# Patient Record
Sex: FEMALE | Race: WHITE | NOT HISPANIC OR LATINO | URBAN - METROPOLITAN AREA
[De-identification: names, ages, dates, MRNs, and addresses within clinical notes are randomized per-mention and may not be internally consistent; named-entity substitution may affect disease eponyms.]

---

## 2017-01-13 ENCOUNTER — TRANSFERRED RECORDS (OUTPATIENT)
Dept: HEALTH INFORMATION MANAGEMENT | Facility: CLINIC | Age: 62
End: 2017-01-13

## 2017-01-17 ENCOUNTER — MEDICAL CORRESPONDENCE (OUTPATIENT)
Dept: HEALTH INFORMATION MANAGEMENT | Facility: CLINIC | Age: 62
End: 2017-01-17

## 2017-01-30 ENCOUNTER — OFFICE VISIT (OUTPATIENT)
Dept: SURGERY | Facility: CLINIC | Age: 62
End: 2017-01-30

## 2017-01-30 ENCOUNTER — ANESTHESIA EVENT (OUTPATIENT)
Dept: SURGERY | Facility: CLINIC | Age: 62
DRG: 737 | End: 2017-01-30
Payer: COMMERCIAL

## 2017-01-30 ENCOUNTER — ONCOLOGY VISIT (OUTPATIENT)
Dept: ONCOLOGY | Facility: CLINIC | Age: 62
End: 2017-01-30
Attending: OBSTETRICS & GYNECOLOGY
Payer: COMMERCIAL

## 2017-01-30 ENCOUNTER — ALLIED HEALTH/NURSE VISIT (OUTPATIENT)
Dept: SURGERY | Facility: CLINIC | Age: 62
End: 2017-01-30

## 2017-01-30 VITALS
HEIGHT: 66 IN | RESPIRATION RATE: 16 BRPM | OXYGEN SATURATION: 95 % | HEART RATE: 93 BPM | WEIGHT: 132.6 LBS | DIASTOLIC BLOOD PRESSURE: 82 MMHG | TEMPERATURE: 98.1 F | SYSTOLIC BLOOD PRESSURE: 138 MMHG | BODY MASS INDEX: 21.31 KG/M2

## 2017-01-30 VITALS
HEART RATE: 90 BPM | OXYGEN SATURATION: 100 % | TEMPERATURE: 98.3 F | BODY MASS INDEX: 21.31 KG/M2 | DIASTOLIC BLOOD PRESSURE: 77 MMHG | WEIGHT: 132.6 LBS | SYSTOLIC BLOOD PRESSURE: 130 MMHG | HEIGHT: 66 IN | RESPIRATION RATE: 18 BRPM

## 2017-01-30 DIAGNOSIS — Z01.818 PREOPERATIVE EXAMINATION: ICD-10-CM

## 2017-01-30 DIAGNOSIS — Z01.818 PREOP EXAMINATION: Primary | ICD-10-CM

## 2017-01-30 DIAGNOSIS — C56.9 MALIGNANT NEOPLASM OF OVARY (H): ICD-10-CM

## 2017-01-30 DIAGNOSIS — C56.9 MALIGNANT NEOPLASM OF OVARY (H): Primary | ICD-10-CM

## 2017-01-30 DIAGNOSIS — R19.03 ABDOMINAL MASS, RIGHT LOWER QUADRANT: ICD-10-CM

## 2017-01-30 PROBLEM — D25.9 UTERINE FIBROID: Status: ACTIVE | Noted: 2017-01-30

## 2017-01-30 LAB
ALBUMIN SERPL-MCNC: 3.2 G/DL (ref 3.4–5)
ALP SERPL-CCNC: 350 U/L (ref 40–150)
ALT SERPL W P-5'-P-CCNC: 17 U/L (ref 0–50)
ANION GAP SERPL CALCULATED.3IONS-SCNC: 8 MMOL/L (ref 3–14)
AST SERPL W P-5'-P-CCNC: 16 U/L (ref 0–45)
BASOPHILS # BLD AUTO: 0.1 10E9/L (ref 0–0.2)
BASOPHILS NFR BLD AUTO: 0.6 %
BILIRUB SERPL-MCNC: 0.2 MG/DL (ref 0.2–1.3)
BUN SERPL-MCNC: 7 MG/DL (ref 7–30)
CALCIUM SERPL-MCNC: 9 MG/DL (ref 8.5–10.1)
CANCER AG125 SERPL-ACNC: 5232 U/ML (ref 0–30)
CEA SERPL-MCNC: 8.2 UG/L (ref 0–2.5)
CHLORIDE SERPL-SCNC: 100 MMOL/L (ref 94–109)
CO2 SERPL-SCNC: 29 MMOL/L (ref 20–32)
CREAT SERPL-MCNC: 0.58 MG/DL (ref 0.52–1.04)
DIFFERENTIAL METHOD BLD: ABNORMAL
EOSINOPHIL # BLD AUTO: 0.1 10E9/L (ref 0–0.7)
EOSINOPHIL NFR BLD AUTO: 0.6 %
ERYTHROCYTE [DISTWIDTH] IN BLOOD BY AUTOMATED COUNT: 14 % (ref 10–15)
GFR SERPL CREATININE-BSD FRML MDRD: ABNORMAL ML/MIN/1.7M2
GLUCOSE SERPL-MCNC: 94 MG/DL (ref 70–99)
HCT VFR BLD AUTO: 39.3 % (ref 35–47)
HGB BLD-MCNC: 12.7 G/DL (ref 11.7–15.7)
IMM GRANULOCYTES # BLD: 0.1 10E9/L (ref 0–0.4)
IMM GRANULOCYTES NFR BLD: 0.5 %
LYMPHOCYTES # BLD AUTO: 2.1 10E9/L (ref 0.8–5.3)
LYMPHOCYTES NFR BLD AUTO: 13.4 %
MCH RBC QN AUTO: 29.5 PG (ref 26.5–33)
MCHC RBC AUTO-ENTMCNC: 32.3 G/DL (ref 31.5–36.5)
MCV RBC AUTO: 91 FL (ref 78–100)
MONOCYTES # BLD AUTO: 1 10E9/L (ref 0–1.3)
MONOCYTES NFR BLD AUTO: 6.4 %
NEUTROPHILS # BLD AUTO: 12 10E9/L (ref 1.6–8.3)
NEUTROPHILS NFR BLD AUTO: 78.5 %
NRBC # BLD AUTO: 0 10*3/UL
NRBC BLD AUTO-RTO: 0 /100
PLATELET # BLD AUTO: 654 10E9/L (ref 150–450)
POTASSIUM SERPL-SCNC: 4 MMOL/L (ref 3.4–5.3)
PROT SERPL-MCNC: 7.4 G/DL (ref 6.8–8.8)
RBC # BLD AUTO: 4.3 10E12/L (ref 3.8–5.2)
SODIUM SERPL-SCNC: 138 MMOL/L (ref 133–144)
WBC # BLD AUTO: 15.3 10E9/L (ref 4–11)

## 2017-01-30 PROCEDURE — 99205 OFFICE O/P NEW HI 60 MIN: CPT | Mod: 25 | Performed by: OBSTETRICS & GYNECOLOGY

## 2017-01-30 PROCEDURE — 85025 COMPLETE CBC W/AUTO DIFF WBC: CPT | Performed by: OBSTETRICS & GYNECOLOGY

## 2017-01-30 PROCEDURE — 99213 OFFICE O/P EST LOW 20 MIN: CPT

## 2017-01-30 PROCEDURE — 36415 COLL VENOUS BLD VENIPUNCTURE: CPT | Performed by: OBSTETRICS & GYNECOLOGY

## 2017-01-30 PROCEDURE — 80053 COMPREHEN METABOLIC PANEL: CPT | Performed by: OBSTETRICS & GYNECOLOGY

## 2017-01-30 PROCEDURE — 86304 IMMUNOASSAY TUMOR CA 125: CPT | Performed by: OBSTETRICS & GYNECOLOGY

## 2017-01-30 PROCEDURE — 82378 CARCINOEMBRYONIC ANTIGEN: CPT | Performed by: OBSTETRICS & GYNECOLOGY

## 2017-01-30 RX ORDER — ALBUTEROL SULFATE 90 UG/1
2 AEROSOL, METERED RESPIRATORY (INHALATION) DAILY PRN
COMMUNITY
Start: 2013-06-26

## 2017-01-30 RX ORDER — SODIUM PHOSPHATE,MONO-DIBASIC 19G-7G/118
1 ENEMA (ML) RECTAL DAILY
COMMUNITY

## 2017-01-30 RX ORDER — IPRATROPIUM BROMIDE AND ALBUTEROL SULFATE 2.5; .5 MG/3ML; MG/3ML
3 SOLUTION RESPIRATORY (INHALATION) ONCE
Status: CANCELLED | OUTPATIENT
Start: 2017-01-30 | End: 2017-01-30

## 2017-01-30 ASSESSMENT — ENCOUNTER SYMPTOMS
POLYPHAGIA: 0
HEARTBURN: 1
BLOOD IN STOOL: 0
NIGHT SWEATS: 0
INCREASED ENERGY: 1
JAUNDICE: 0
CHILLS: 1
ALTERED TEMPERATURE REGULATION: 0
CONSTIPATION: 1
VOMITING: 1
WEIGHT GAIN: 0
RECTAL PAIN: 0
FATIGUE: 1
ABDOMINAL PAIN: 1
POLYDIPSIA: 0
BLOATING: 1
DIARRHEA: 0
FEVER: 0
BOWEL INCONTINENCE: 0
HALLUCINATIONS: 0
RECTAL BLEEDING: 0
WEIGHT LOSS: 1
DECREASED APPETITE: 1
NAUSEA: 1

## 2017-01-30 ASSESSMENT — PAIN SCALES - GENERAL: PAINLEVEL: NO PAIN (0)

## 2017-01-30 ASSESSMENT — COPD QUESTIONNAIRES
COPD: 1
CAT_SEVERITY: MILD

## 2017-01-30 ASSESSMENT — LIFESTYLE VARIABLES: TOBACCO_USE: 1

## 2017-01-30 NOTE — PROGRESS NOTES
Pre Op Nurse Teaching Template    Relevant Diagnosis: pelvic mass    Teaching Topic:  Open surgery removal of  Uterus tubes ovaries cervix possible cancer operation    Person(s) involved in teaching :  Patient    Motivation Level:  Asks Questions:    Yes      Eager to Learn:     Yes     Cooperative:          Yes    Receptive (willing. Able to accept information):    Yes      Patient and those who are listed above demonstrates understanding of the following:   Reason for the appointment, diagnosis and treatment plan:   Yes   Knowledge of proper use of medications and conditions for which they are ordered (with special attention to potential side effects or drug interactions): Yes   Which situations necessitate calling provider and whom to contact: Yes         Nutritional needs and diet plan:  Yes      Pain management techniques:     Yes, Pain Scale   Diet:   Yes, [facility] Diet Instructions    Teaching Concerns addressed: Yes    Infection Prevention:  Patient and those who are listed above demonstrate understanding of the following:  Pre-Op CHG Bathing Instructions: Yes  Surgical procedure site care taught:   Yes   Signs and symptoms of infection taught: Yes       Instructional Materials Used/Given:  The [place] Before You Surgery Booklet  Showering or Bathing before Surgery Instructions & CHG Product  Hysterectomy Guidelines  Pain Assessment Tool   Home Care after Major Abdominal or Vaginal Surgery  Map  Accommodations Brochure  Phone numbers for [facility] and [facility]  Copy of Surgical Consent    Done Today:  Lab work, EKG, Chest Xray,   Preop Visit needed with PAC  Tests Ordered:  Post Op Visit Scheduled:  tbd  Comments:    Surgery date/time:    Consent faxed to  at [phone]  Original sent to file in medical records    .

## 2017-01-30 NOTE — PROGRESS NOTES
This note was dictated - but is unavailable - I have tried to recollect the details of her consultation below.                            Consult Notes on Referred Patient    Date: 2017       Dr. Uvaldo Griffin  [facility]  [address] [place] [address]       RE: Lucero Dsouza  : 1955  RIGOBERTO: 2017    Dear Dr. Uvaldo Griffin:    I had the pleasure of seeing your patient Lucero Dsouza here at the Gynecologic Cancer Clinic at the [facility] on 2017.  As you know she is a very pleasant 61 year old woman with a recent diagnosis of  Pelvic mass and ascites associated with high CA-125 (>5000).  Given these findings she was subsequently sent to the Gynecologic Cancer Clinic for new patient consultation.     Ms. Dsouza presented recently to her primary care physician with complaints of weight loss, diarrhea, bloating and pressure.  This prompted an examination and subsequent CT which demonstrated the findings (this is scanned in to [facility]).  She presents today to discuss options for treatment.      Review of Systems:    Systemic           yes - weight changes and bloating  Skin           no rashes, or lesions  Eye           no irritation; no changes in vision  Harsh-Laryngeal           no dysphagia; no hoarseness   Pulmonary    no cough; no shortness of breath  Cardiovascular    no chest pain; no palpitations  Gastrointestinal    yes - bloating and diarrhea    Genitourinary   no urinary frequency; no urinary urgency; no dysuria; no pain; no abnormal vaginal discharge; no abnormal vaginal bleeding  Breast   no breast discharge; no breast changes; no breast pain  Musculoskeletal    no myalgias; no arthralgias; no back pain  Psychiatric           no depressed mood; no anxiety    Hematologic           no tender lymph nodes; no noticeable swellings or lumps   Endocrine    no hot flashes; no heat/cold intolerance         Neurological   no tremor; no numbness and tingling; no  headaches; no difficulty  sleeping      Past Medical History:    Past Medical History   Diagnosis Date     Abdominal mass, RLQ (right lower quadrant)      Asthma      COPD (chronic obstructive pulmonary disease) (H)      Depression      GERD (gastroesophageal reflux disease)      History of alcohol abuse      History of methamphetamine abuse      HLD (hyperlipidemia)      Tobacco use          Past Surgical History:    Past Surgical History   Procedure Laterality Date     Laminect/discectomy, lumbar       Carpal tunnel release rt/lt       Colonoscopy       Appendectomy           Health Maintenance:  Health Maintenance Due   Topic Date Due     JEREL QUESTIONNAIRE 1 YEAR  1955     PHQ-9 Q1YR (NO INBASKET)  1955     TETANUS IMMUNIZATION (SYSTEM ASSIGNED)  03/02/1975     ADVANCE DIRECTIVE PLANNING Q5 YRS (NO INBASKET)  03/02/1975     HEPATITIS C SCREENING  03/02/1975     PAP SCREENING Q3 YR (SYSTEM ASSIGNED)  03/02/1978     MAMMO SCREEN Q2 YR (SYSTEM ASSIGNED)  03/02/1997     LIPID SCREEN Q5 YR FEMALE (SYSTEM ASSIGNED)  03/02/2002     COLON CANCER SCREEN (SYSTEM ASSIGNED)  03/02/2007     INFLUENZA VACCINE (SYSTEM ASSIGNED)  09/01/2016           Last Colonoscopy: 2013              Result: abnormal: polyps (follow-up 2018)                    Current Medications:     has a current medication list which includes the following prescription(s): albuterol, nicotine, omeprazole, multiple vitamins-minerals, bismuth subsalicylate, ascorbic acid, and glucosamine-chondroitin.       Allergies:        Allergies   Allergen Reactions     Sulfa Drugs Shortness Of Breath     Cefaclor Hives     Morphine GI Disturbance     migraines         Social History:     Social History   Substance Use Topics     Smoking status: Current Every Day Smoker     Packs/day: 2.00     Smokeless tobacco: Not on file     Alcohol use No       History   Drug Use No           Family History:     The patient's family history is notable for .    Family  "History   Problem Relation Age of Onset     Colon Cancer Mother      Substance Abuse Father      Coronary Artery Disease Father      Hypertension Father      Hyperlipidemia Father      Depression Father      Colon Cancer Maternal Grandfather          Physical Exam:     /77  Pulse 90  Temp 98.3  F (36.8  C) (Oral)  Resp 18  Ht 1.676 m (5' 6\")  Wt 60.1 kg (132 lb 9.6 oz)  SpO2 100%  BMI 21.4 kg/m2  Body mass index is 21.4 kg/(m^2).    General Appearance: healthy and alert, no distress     HEENT:  no thyromegaly, no palpable nodules or masses        Cardiovascular: regular rate and rhythm, no gallops, rubs or murmurs     Respiratory: lungs clear, no rales, rhonchi or wheezes, normal diaphragmatic excursion    Musculoskeletal: extremities non tender and without edema    Skin: no lesions or rashes     Neurological: normal gait, no gross defects     Psychiatric: appropriate mood and affect                               Hematological: normal cervical, supraclavicular and inguinal lymph nodes     Gastrointestinal:       abdomen soft, non-tender, non-distended, no organomegaly or masses    Genitourinary: External genitalia and urethral meatus appears normal.  Vagina is smooth without nodularity or masses.  Cervix appears normal and without lesions.  Bimanual exam reveal masses in the posterior cul-de-sac without clear peritoneal studding and without clear rectal involvement.       Assessment:    Lucero Dsouza is a 61 year old woman with a new diagnosis of ascites and possible carcinomatosis.     A total of 60 minutes was spent with the patient, 40 minutes of which were spent in counseling the patient and/or treatment planning.      Plan:     1.)   PElvic mass - we have discussed the findings and given the high CA-125 ovarian cancer is certainly the primary concern.  WE have discussed options including biopsy and fang-adjuvant treatment or a laparotomy followed by intra-operative evaluation and possible debulking.  " Having discussed the options in comprehensive fashion she is inclined to attempt traditional surgery followed by chemotherapy.  She is aware that we may abandon the operation if it is deemed infeasible.    To this end we have discussed multiple trial for which she is eligable here including (but not limited to) the Skubitz pap study, the avatar and NACT trials, potentially and the SKubitz tumor marker trial.     2.) Genetic risk factors were assessed and the patient maymeet the qualifications for a referral, will discuss post-operatively.      3.) Labs and/or tests ordered include:  Pre-operative labs.     4.) Health maintenance issues addressed today include none.    5.) Pre-op teaching was completed today.  Risks of surgery were discussed to include: bleeding, transfusion, infection, unintentional injury to surrounding organs/structures.      Thank you for allowing us to participate in the care of your patient.         Sincerely,      Juan Daniel      CC  Patient Care Team:  Uvaldo Griffin as PCP - General (Family Practice)  UVALDO GRIFFIN

## 2017-01-30 NOTE — H&P
Pre-Operative H & P     Date of Encounter: 1/30/2017  Primary Care Physician:  Uvaldo Griffin    CC: Abdominal mass, concern for colon or ovarian cancer.    HPI:  Lucero Dsouza is a 61 year old female who presents for pre-operative H & P in preparation for Presumed Open Hysterectomy, Bilateral Salpino-oophorectomy, Removal of Ovarian  Mass on 2/17/17 with Dr. Juan Daniel at [facility].  The patient presented to her Primary Care office on 12/8/16 as part of the [place] program for breast and pelvic screening.  She reported that she had lost approximately 25 pounds over the prior 3 months.  In addition, she reported some right lower abdominal pain, a palpable mass over the right lower abdomen, as well as changes in her stool, including diarrhea.  Physical exam revealed a soft, non-tender, large abdominal mass measuring >10 cm in the right lower quadrant of the abdomen, extending across the midabdomen.  Plans were made for the patient to proceed with a CT scan once she obtained medical insurance.  This was completed on 1/13/17 and identified a large complex mass arising out of the pelvis.  Due to concerns for colon or ovarian cancer, the patient was referred to [facility] for further evaluation and treatment.  The patient was evaluated on 1/30 and arrangements are being made for the above procedures.    History is obtained from the patient and the medical record.    Past Medical History:  Past Medical History   Diagnosis Date     Abdominal mass, RLQ (right lower quadrant)      Tobacco use      Depression      Asthma      HLD (hyperlipidemia)      History of alcohol abuse      History of methamphetamine abuse      GERD (gastroesophageal reflux disease)      Past Surgical History:  Past Surgical History   Procedure Laterality Date     Laminect/discectomy, lumbar       Carpal tunnel release rt/lt       Colonoscopy       Hx of Blood transfusions/reactions: Denies.     Hx of abnormal  bleeding or anti-platelet use: Denies.    Menstrual history: No LMP recorded. Postmenopausal.    Steroid use in the last year: Denies.    Personal or FH of difficulty with anesthesia:  Sister with significant PONV, but no difficulties in the patient.      Prior to admission medications  Current Outpatient Prescriptions   Medication Sig Dispense Refill     albuterol (PROAIR HFA/PROVENTIL HFA/VENTOLIN HFA) 108 (90 BASE) MCG/ACT Inhaler Inhale 2 puffs into the lungs daily as needed        nicotine (NICOTROL) 10 MG Inhaler Inhale 1-2 puffs into the lungs daily as needed        Multiple Vitamins-Minerals (MULTIVITAMIN ADULT PO) Take 1 tablet by mouth daily        Bismuth Subsalicylate (PEPTO-BISMOL PO) Take by mouth daily       Ascorbic Acid (VITAMIN C PO) Take 500 mg by mouth daily       omeprazole (PRILOSEC) 20 MG CR capsule Take 20 mg by mouth daily        glucosamine-chondroitin 500-400 MG CAPS per capsule Take 1 capsule by mouth daily 1500 mg daily       Allergies  Allergies   Allergen Reactions     Sulfa Drugs Shortness Of Breath     Cefaclor Hives     Morphine GI Disturbance     migraines     Social History  Social History     Social History     Marital Status: Single     Spouse Name: N/A     Number of Children: N/A     Years of Education: N/A     Occupational History     Not on file.     Social History Main Topics     Smoking status: Not on file     Smokeless tobacco: Not on file     Alcohol Use: Not on file     Drug Use: Not on file     Sexual Activity: Not on file     Other Topics Concern     Not on file     Social History Narrative     No narrative on file     Family History  Family History   Problem Relation Age of Onset     Colon Cancer Mother      Substance Abuse Father      Coronary Artery Disease Father      Hypertension Father      Hyperlipidemia Father      Depression Father      Colon Cancer Maternal Grandfather      Review of Systems  Functional status: Independent in ADL's.  4 METS (notes activity is  "slightly limited by shortness of breath.)     The complete review of systems is negative other than noted in the HPI or here.   Constitutional: Denies recent changes in sleeping patterns, or fevers/chills.  Reports an approximately 25 pound weight loss over the past 3-4 months.  Eyes: No recent vision changes.  EENT: Denies recent changes in hearing, mouth pain, or difficulty swallowing.  Notes that her taste sensation is diminished.    Cardiovascular: Denies chest pain, BOTELLO or orthopnea, or palpitations.  Notes some dyspnea with exertion, which causes her to limit her activity slightly.      Respiratory: Denies shortness of breath or significant cough.    GI: Denies diarrhea/constipation.  Reports nausea on a daily basis, particularly after eating.  Vomiting on an every other day basis.    : Denies dysuria.    Musculoskeletal: Denies joint pain or swelling.    Skin: Denies rashes or wounds.    Hematologic: Denies easy bruising or bleeding.    Neurologic: Denies migraines, seizures, dizziness.  Reports numbness over the lateral aspect of the left thigh.    Psychiatric: Denies changes in mood or affect.      /82 mmHg  Pulse 93  Temp(Src) 98.1  F (36.7  C) (Oral)  Resp 16  Ht 1.676 m (5' 6\")  Wt 60.147 kg (132 lb 9.6 oz)  BMI 21.41 kg/m2  SpO2 95%    132 lbs 9.6 oz  5' 6\"   Body mass index is 21.41 kg/(m^2).    Physical Exam  Constitutional: Patient awake, seated upright in a chair, in no apparent distress.  Appears stated age.  Eyes: Pupils equal, round and reactive to light.  Extra ocular muscles intact. Sclera clear.  Conjunctiva normal.  HENT: Head normocephalic.  Oral pharynx intact with moist mucous membranes.  Dentition intact.  No thyromegaly appreciated.   Respiratory: Lung sounds clear to auscultation bilaterally.  No rales, rhonchi, or wheezing noted.    Cardiovascular: S1, S2, regular rate and rhythm.  No murmurs, rubs, or gallops noted. No carotid bruits auscultated.  Radial and pedal " pulses palpable, bilaterally.  No edema noted.   GI: Bowel sounds present.  Abdomen rounded, soft, non-tender to light palpation.  Large mass easily palpated over the RLQ of the abdomen.  Genitourinary: Exam deferred.  Lymph/Hematologic: No cervical or supraclavicular lymphadenopathy noted.  No excessive bruising noted.    Skin: Color appropriate for race, warm, dry.  No rashes or wounds at anticipated surgical site.   Musculoskeletal: Slightly limited extension of the neck.  No redness, warmth, or swelling of the joints noted. Gross motor strength is normal.    Neurologic: Alert, oriented to name, place and time. Cranial nerves II-XII are grossly intact. Gait is normal.      Neuropsychiatric: Calm, cooperative. Normal affect.     Labs:  17: WBC 15.3; Hgb 12.7; Hct 39.3; Plt 654  17: Na 138; K 4; Cl 100; Glu 94; BUN 7; Cr 0.58; Ca 9; Bili tot 0.2; Albumin 3.2; Alk phos 350; ALT 17; AST 16    Imagin17 EKG: Personally reviewed and interpreted as sinus rhythm.  Formal cardiology read pending.    17 CT Abdomen Pelvis:  Findings: There is a large complex mass arising out of the pelvis extending into the mid and right upper quadrant aspects of the abdomen. There are intermixed solid and cystic components to the mass. The solid tissue is heterogeneously enhances. The mass  measures 18 cm CC, 16.3 cm side to side, and 10.7 cm AP. There is mild ascites. There is a hazy appearance throughout the mesentery which likely reflects mesenteric tumor studding. There are enlarged periaortic lymph nodes. The largest node measures approximately 2.7 cm.    The lower lung fields are unremarkable. The liver, gallbladder, pancreas, and spleen are unremarkable. The adrenal glands are normal in size. The kidneys demonstrate symmetric excretion of contrast. The skeletal structures are unremarkable. There is a 1.7 x 2.7 cm rounded nodule in the subcutaneous fat of the left buttock. There is no large or small bowel  distention.        Impression:    1. Large complex mass arising from the pelvis. The finding is likely representative of a ovarian cancer. There is evidence for metastatic disease with periaortic adenopathy as well as mesenteric studding.    2. Mild ascites.    3. Nonspecific nodule in the subcutaneous fat of the left buttock which likely reflects a sebaceous cyst.        1/30/17 CXR:   Findings: Cardiac silhouette and pulmonary vasculature are within normal limits. The lungs are clear. There is no pleural effusion or pneumothorax. Upper abdomen is unremarkable.  Impression: Clear lungs.    Lab results, EKG were personally reviewed by this provider.      Outside records from [facility] reviewed.    Assessment and Plan  Lucero Dsouza is a 61 year old female scheduled to undergo Presumed Open Hysterectomy, Bilateral Salpino-oophorectomy, Removal of Ovarian  Mass on an unknown date with Dr. Juan Daniel.    She has the following specific operative considerations:   1.  Asthma, COPD with ongoing tobacco use: Patient instructed to use Albuterol inhaler as prescribed.  Encourage coughing/deep breathing.  Will order a Duo-Neb treatment to be administered in Pre-Op.     2.  GERD: Patient instructed to take Prilosec as prescribed.  Consider use of RSI techniques with advanced airway maneuvers.  3.  EKG today.  As the patient has never had a blood transfusion and has never delivered a baby, there is a low chance of antibodies.  Will order a type & screen for the AM of surgery as the patient has already had labwork drawn today.      Revised Cardiac Risk Index: 0.9% risk of major adverse cardiac event.  Anesthesia considerations: Refer to PAC assessment in the anesthesia records.  VTE risk: 1.8%  ABDIAZIZ risk: Low  PONV risk score= 1.  (If > 2, anti-emetic intervention is recommended.)    Patient was discussed with Dr. Baker.    Awa Rodriguez NP  Preoperative Assessment Center  [facility]  Phone: [phone]  Fax: [phone]

## 2017-01-30 NOTE — LETTER
2017       RE: Lucero Dsouza  [address] [facility] [address]     Dear Colleague,    Thank you for referring your patient, Lucero Dsouza, to the [facility]. Please see a copy of my visit note below.                              Consult Notes on Referred Patient    Date: 2017       Dr. Uvaldo Griffin  [facility]  [address] [place] [address]       RE: Lucero Dsouza  : 1955  RIGOBERTO: 2017    Dear Dr. Uvaldo Griffin:    I had the pleasure of seeing your patient Lucero Dsouza here at the Gynecologic Cancer Clinic at the [facility] on 2017.  As you know she is a very pleasant 61 year old woman with a recent diagnosis of  Pelvic mass and ascites associated with high CA-125 (>5000).  Given these findings she was subsequently sent to the Gynecologic Cancer Clinic for new patient consultation.     Ms. Dsouza presented recently to her primary care physician with complaints of weight loss, diarrhea, bloating and pressure.  This prompted an examination and subsequent CT which demonstrated the findings (this is scanned in to [facility]).  She presents today to discuss options for treatment.      Review of Systems:    Systemic           yes - weight changes and bloating  Skin           no rashes, or lesions  Eye           no irritation; no changes in vision  Harsh-Laryngeal           no dysphagia; no hoarseness   Pulmonary    no cough; no shortness of breath  Cardiovascular    no chest pain; no palpitations  Gastrointestinal    yes - bloating and diarrhea    Genitourinary   no urinary frequency; no urinary urgency; no dysuria; no pain; no abnormal vaginal discharge; no abnormal vaginal bleeding  Breast   no breast discharge; no breast changes; no breast pain  Musculoskeletal    no myalgias; no arthralgias; no back pain  Psychiatric           no depressed mood; no anxiety    Hematologic           no tender lymph nodes; no noticeable swellings or lumps    Endocrine    no hot flashes; no heat/cold intolerance         Neurological   no tremor; no numbness and tingling; no headaches; no difficulty  sleeping      Past Medical History:    Past Medical History   Diagnosis Date     Abdominal mass, RLQ (right lower quadrant)      Asthma      COPD (chronic obstructive pulmonary disease) (H)      Depression      GERD (gastroesophageal reflux disease)      History of alcohol abuse      History of methamphetamine abuse      HLD (hyperlipidemia)      Tobacco use          Past Surgical History:    Past Surgical History   Procedure Laterality Date     Laminect/discectomy, lumbar       Carpal tunnel release rt/lt       Colonoscopy       Appendectomy           Health Maintenance:  Health Maintenance Due   Topic Date Due     JEREL QUESTIONNAIRE 1 YEAR  1955     PHQ-9 Q1YR (NO INBASKET)  1955     TETANUS IMMUNIZATION (SYSTEM ASSIGNED)  03/02/1975     ADVANCE DIRECTIVE PLANNING Q5 YRS (NO INBASKET)  03/02/1975     HEPATITIS C SCREENING  03/02/1975     PAP SCREENING Q3 YR (SYSTEM ASSIGNED)  03/02/1978     MAMMO SCREEN Q2 YR (SYSTEM ASSIGNED)  03/02/1997     LIPID SCREEN Q5 YR FEMALE (SYSTEM ASSIGNED)  03/02/2002     COLON CANCER SCREEN (SYSTEM ASSIGNED)  03/02/2007     INFLUENZA VACCINE (SYSTEM ASSIGNED)  09/01/2016           Last Colonoscopy: 2013              Result: abnormal: polyps (follow-up 2018)                    Current Medications:     has a current medication list which includes the following prescription(s): albuterol, nicotine, omeprazole, multiple vitamins-minerals, bismuth subsalicylate, ascorbic acid, and glucosamine-chondroitin.       Allergies:        Allergies   Allergen Reactions     Sulfa Drugs Shortness Of Breath     Cefaclor Hives     Morphine GI Disturbance     migraines         Social History:     Social History   Substance Use Topics     Smoking status: Current Every Day Smoker     Packs/day: 2.00     Smokeless tobacco: Not on file     Alcohol use No  "      History   Drug Use No           Family History:     The patient's family history is notable for .    Family History   Problem Relation Age of Onset     Colon Cancer Mother      Substance Abuse Father      Coronary Artery Disease Father      Hypertension Father      Hyperlipidemia Father      Depression Father      Colon Cancer Maternal Grandfather          Physical Exam:     /77  Pulse 90  Temp 98.3  F (36.8  C) (Oral)  Resp 18  Ht 1.676 m (5' 6\")  Wt 60.1 kg (132 lb 9.6 oz)  SpO2 100%  BMI 21.4 kg/m2  Body mass index is 21.4 kg/(m^2).    General Appearance: healthy and alert, no distress     HEENT:  no thyromegaly, no palpable nodules or masses        Cardiovascular: regular rate and rhythm, no gallops, rubs or murmurs     Respiratory: lungs clear, no rales, rhonchi or wheezes, normal diaphragmatic excursion    Musculoskeletal: extremities non tender and without edema    Skin: no lesions or rashes     Neurological: normal gait, no gross defects     Psychiatric: appropriate mood and affect                               Hematological: normal cervical, supraclavicular and inguinal lymph nodes     Gastrointestinal:       abdomen soft, non-tender, non-distended, no organomegaly or masses    Genitourinary: External genitalia and urethral meatus appears normal.  Vagina is smooth without nodularity or masses.  Cervix appears normal and without lesions.  Bimanual exam reveal masses in the posterior cul-de-sac without clear peritoneal studding and without clear rectal involvement.       Assessment:    Lucero Dsouza is a 61 year old woman with a new diagnosis of ascites and possible carcinomatosis.     A total of 60 minutes was spent with the patient, 40 minutes of which were spent in counseling the patient and/or treatment planning.      Plan:     1.)   PElvic mass - we have discussed the findings and given the high CA-125 ovarian cancer is certainly the primary concern.  WE have discussed options including " biopsy and fang-adjuvant treatment or a laparotomy followed by intra-operative evaluation and possible debulking.  Having discussed the options in comprehensive fashion she is inclined to attempt traditional surgery followed by chemotherapy.  She is aware that we may abandon the operation if it is deemed infeasible.    To this end we have discussed multiple trial for which she is eligable here including (but not limited to) the Skubitz pap study, the avatar and NACT trials, potentially and the SKubitz tumor marker trial.     2.) Genetic risk factors were assessed and the patient maymeet the qualifications for a referral, will discuss post-operatively.      3.) Labs and/or tests ordered include:  Pre-operative labs.     4.) Health maintenance issues addressed today include none.    5.) Pre-op teaching was completed today.  Risks of surgery were discussed to include: bleeding, transfusion, infection, unintentional injury to surrounding organs/structures.      Thank you for allowing us to participate in the care of your patient.         Sincerely,      Juan GARCIA  Patient Care Team:  Uvaldo Griffin as PCP - General (Family Practice)

## 2017-01-30 NOTE — NURSING NOTE
"Lucero ANTUNEZ Meet is a 61 year old female who presents for:  Chief Complaint   Patient presents with     Oncology Clinic Visit     Ovarian Mass        Initial Vitals:  /77 mmHg  Pulse 90  Temp(Src) 98.3  F (36.8  C) (Oral)  Resp 18  Ht 1.676 m (5' 6\")  Wt 60.147 kg (132 lb 9.6 oz)  BMI 21.41 kg/m2  SpO2 100% Estimated body mass index is 21.41 kg/(m^2) as calculated from the following:    Height as of this encounter: 1.676 m (5' 6\").    Weight as of this encounter: 60.147 kg (132 lb 9.6 oz).. Body surface area is 1.67 meters squared. BP completed using cuff size: regular  No Pain (0) No LMP recorded. Allergies and medications reviewed.     Medications: Medication refills not needed today.  Pharmacy name entered into EPIC: Data Unavailable    Comments:     8 minutes for nursing intake (face to face time)   Kayley Kumar CMA          "

## 2017-01-30 NOTE — ANESTHESIA PREPROCEDURE EVALUATION
Anesthesia Evaluation     . Pt has had prior anesthetic. Type: General and MAC (Sister with PONV)    No history of anesthetic complications     ROS/MED HX    ENT/Pulmonary:     (+)tobacco use, Current use 1 PPD, smoking for 35 years packs/day  Intermittent asthma Last exacerbation: Once approximately 3 months ago, with a URI,Treatment: Inhaler prn,  mild COPD, , . .    Neurologic:     (+)neuropathy - Over the lateral aspect of the left thigh,     Cardiovascular:     (+) Dyslipidemia, ----. : . . BOTELLO, . :. .       METS/Exercise Tolerance: Comment: Reports that dyspnea with exertion does not cause her to stop, but does slightly limit her activity.   4 - Raking leaves, gardening   Hematologic:  - neg hematologic  ROS       Musculoskeletal:   (+) , , other musculoskeletal- Cervical spine fusion       GI/Hepatic:     (+) GERD Asymptomatic on medication, bowel prep, Other GI/Hepatic Abdominal mass, concern for colon or ovarian cancer      Renal/Genitourinary:  - ROS Renal section negative       Endo:  - neg endo ROS       Psychiatric:     (+) psychiatric history depression      Infectious Disease:  - neg infectious disease ROS       Malignancy:   (+) Malignancy History of Other  Other CA Abdominal mass, concern for colon or ovarian cancer Active status post         Other:    - neg other ROS           Physical Exam      Airway   Mallampati: I  TM distance: >3 FB  Neck ROM: limited    Dental   (+) partials and implants    Cardiovascular   Rhythm and rate: regular and normal  (-) carotid bruit is not present and no peripheral edema    Pulmonary    breath sounds clear to auscultation(-) no wheezes and no rales    Other findings: 1/30/17: WBC 15.3; Hgb 12.7; Hct 39.3; Plt 654  1/30/17: Na 138; K 4; Cl 100; Glu 94; BUN 7; Cr 0.58; Ca 9; Bili tot 0.2; Albumin 3.2; Alk phos 350; ALT 17; AST 16    1/30/17 EKG: Personally reviewed and interpreted as sinus rhythm.     1/13/17 CT Abdomen Pelvis:  Findings: There is a large complex  mass arising out of the pelvis extending into the mid and right upper quadrant aspects of the abdomen. There are intermixed solid and cystic components to the mass. The solid tissue is heterogeneously enhances. The mass  measures 18 cm CC, 16.3 cm side to side, and 10.7 cm AP. There is mild ascites. There is a hazy appearance throughout the mesentery which likely reflects mesenteric tumor studding. There are enlarged periaortic lymph nodes. The largest node measures approximately 2.7 cm.      The lower lung fields are unremarkable. The liver, gallbladder, pancreas, and spleen are unremarkable. The adrenal glands are normal in size. The kidneys demonstrate symmetric excretion of contrast. The skeletal structures are unremarkable. There is a 1.7 x 2.7 cm rounded nodule in the subcutaneous fat of the left buttock. There is no large or small bowel distention.        Impression:    1. Large complex mass arising from the pelvis. The finding is likely representative of a ovarian cancer. There is evidence for metastatic disease with periaortic adenopathy as well as mesenteric studding.    2. Mild ascites.    3. Nonspecific nodule in the subcutaneous fat of the left buttock which likely reflects a sebaceous cyst.        1/30/17 CXR:   Findings: Cardiac silhouette and pulmonary vasculature are within normal limits. The lungs are clear. There is no pleural effusion or pneumothorax. Upper abdomen is unremarkable.  Impression: Clear lungs.             PAC Discussion and Assessment    ASA Classification: 3  Case is suitable for: [place]  Anesthetic techniques and relevant risks discussed: GA and GA with regional block for post-op pain control  Invasive monitoring and risk discussed: Yes  Types:   Possibility and Risk of blood transfusion discussed: Yes  NPO instructions given:   Additional anesthetic preparation and risks discussed:   Needs early admission to pre-op area:   Other:     PAC Resident/NP Anesthesia  Assessment:  Lucero Dsouza is a 61 year old female scheduled to undergo Presumed Open Hysterectomy, Bilateral Salpino-oophorectomy, Removal of Ovarian  Mass on an unknown date with Dr. Juan Daniel.    She has the following specific operative considerations:   1.  Asthma, COPD with ongoing tobacco use: Patient instructed to use Albuterol inhaler as prescribed.  Encourage coughing/deep breathing.  Will order a Duo-Neb treatment to be administered in Pre-Op.     2.  GERD: Patient instructed to take Prilosec as prescribed.  Consider use of RSI techniques with advanced airway maneuvers.  3.  EKG today.  As the patient has never had a blood transfusion and has never delivered a baby, there is a low chance of antibodies.  Will order a type & screen for the AM of surgery as the patient has already had labwork drawn today.      Revised Cardiac Risk Index: 0.9% risk of major adverse cardiac event.  Anesthesia considerations: Refer to PAC assessment in the anesthesia records.  VTE risk: 1.8%  ABDIAZIZ risk: Low  PONV risk score= 1.  (If > 2, anti-emetic intervention is recommended.)      Reviewed and Signed by PAC Mid-Level Provider/Resident  Mid-Level Provider/Resident: Awa Rodriguez CNP  Date: 1/30/17  Time: 1825    Attending Anesthesiologist Anesthesia Assessment:  61 year old for open excision of multiple ovarian masses, possible metastatic ovarian cancer. Chart reviewed, patient seen and evaluated; agree with above assessment. Patient has no known cardiac or pulmonary disease, is very active without symptoms. However she is a 1 pk a day for 35 years smoker, still smoking ~ 1 ppd. She is definitely working hard to quit, will use nicotine patches and has moved her quit day up from March 2 (62th birthday) to before her surgery. Given encouragement. We discussed possible TAP versus epidural block.    Patient is appropriate for the planned procedure without further workup or medical management change. The final anesthesia plan  will be determined by the physician anesthesiologist caring for the patient on the day of surgery.      Reviewed and Signed by PAC Anesthesiologist  Anesthesiologist: Yoselyn Baker MD  Date: 1/30/2017  Time:   Pass/Fail: Pass  Disposition:     PAC Pharmacist Assessment:        Pharmacist:   Date:   Time:      Anesthesia Plan      History & Physical Review  History and physical reviewed and following examination; no interval change.    ASA Status:  2 .        Plan for General and ETT with Intravenous induction.   PONV prophylaxis:  Ondansetron (or other 5HT-3) and Dexamethasone or Solumedrol  Additional equipment: 2nd IV      Postoperative Care  Postoperative pain management:  IV analgesics and Oral pain medications.  Plan for postoperative opioid use.    Consents  Anesthetic plan, risks, benefits and alternatives discussed with:  Patient.  Use of blood products discussed: Yes.   Use of blood products discussed with Patient.  Consented to blood products.  .            History and physical reviewed; no interval change.     Patient seen, examined, reviewed. See above for details from PAC. Note updated.      Jass Leigh  Staff Anesthesiologist  12:56 PM February 14, 2017                    .

## 2017-01-30 NOTE — MR AVS SNAPSHOT
After Visit Summary   1/30/2017    Lucero Dsouza    MRN: [id number]           Patient Information     Date Of Birth          1955        Visit Information        Provider Department      1/30/2017 12:00 PM Juan Daniel MD [facility]        Today's Diagnoses     Malignant neoplasm of ovary (H)    -  1    Preoperative examination           Follow-ups after your visit        Additional Services     PAC Visit Referral (For [facility] Only)       Does this visit require an Anesthesia consult?  Yes - Evaluate for medical necessity related to one of the following conditions:  Other: heavy smoker    H&P done by:  N/A and Other (Specify): pac      Please be aware that coverage of these services is subject to the terms and limitations of your health insurance plan.  Call member services at your health plan with any benefit or coverage questions.      Please bring the following to your appointment:  >>   Any x-rays, CTs or MRIs which have been performed.  Contact the facility where they were done to arrange for  prior to your scheduled appointment.  Any new CT, MRI or other procedures ordered by your specialist must be performed at a [facility] facility or coordinated by your clinic's referral office.    >>   List of current medications  >>   This referral request   >>   Any documents/labs given to you for this referral                  Your next 10 appointments already scheduled     Feb 14, 2017   Procedure with Juan Daniel MD   [facility], [place], Same Day Surgery (--)    [address] [address] [phone]              Who to contact     If you have questions or need follow up information about today's clinic visit or your schedule please contact [facility] directly at [phone].  Normal or non-critical lab and imaging results will be communicated to you by MyChart, letter or phone within 4 business days after the clinic has  "received the results. If you do not hear from us within 7 days, please contact the clinic through [employer] or phone. If you have a critical or abnormal lab result, we will notify you by phone as soon as possible.  Submit refill requests through [employer] or call your pharmacy and they will forward the refill request to us. Please allow 3 business days for your refill to be completed.          Additional Information About Your Visit        [employer] Information     [employer] gives you secure access to your electronic health record. If you see a primary care provider, you can also send messages to your care team and make appointments. If you have questions, please call your primary care clinic.  If you do not have a primary care provider, please call [phone] and they will assist you.        Care EveryWhere ID     This is your Care EveryWhere ID. This could be used by other organizations to access your [facility] medical records  [id number]        Your Vitals Were     Pulse Temperature Respirations Height Pulse Oximetry BMI (Body Mass Index)    90 98.3  F (36.8  C) (Oral) 18 1.676 m (5' 6\") 100% 21.4 kg/m2       Blood Pressure from Last 3 Encounters:   01/30/17 138/82   01/30/17 130/77    Weight from Last 3 Encounters:   01/30/17 60.1 kg (132 lb 9.6 oz)   01/30/17 60.1 kg (132 lb 9.6 oz)              We Performed the Following     EKG 12-Lead Complete w/read - Clinics     PAC Visit Referral (For [facility] Only)     [place]-Operative Worksheet - Exploratory Laparotomy Total Abdominal Hysterectomy, bilateral Salpingo-Oophorectomy, tumor debulking, omentectomy, possible intraperitoneal port placement        Primary Care Provider Office Phone # Fax #    Uvaldo Griffin [phone]       [facility] [address] [facility] [address]        Thank you!     Thank you for choosing [facility]  for your care. Our goal is always to provide you with excellent care. Hearing back from our patients is " one way we can continue to improve our services. Please take a few minutes to complete the written survey that you may receive in the mail after your visit with us. Thank you!             Your Updated Medication List - Protect others around you: Learn how to safely use, store and throw away your medicines at www.disposemymeds.org.          This list is accurate as of: 1/30/17 11:59 PM.  Always use your most recent med list.                   Brand Name Dispense Instructions for use    albuterol 108 (90 BASE) MCG/ACT Inhaler    PROAIR HFA/PROVENTIL HFA/VENTOLIN HFA     Inhale 2 puffs into the lungs daily as needed       glucosamine-chondroitin 500-400 MG Caps per capsule      Take 1 capsule by mouth daily 1500 mg daily       MULTIVITAMIN ADULT PO      Take 1 tablet by mouth daily       NICOTROL 10 MG Inhaler   Generic drug:  nicotine      Inhale 1-2 puffs into the lungs daily as needed       PEPTO-BISMOL PO      Take by mouth daily       priLOSEC 20 MG CR capsule   Generic drug:  omeprazole      Take 20 mg by mouth daily       VITAMIN C PO      Take 500 mg by mouth daily

## 2017-01-31 LAB — INTERPRETATION ECG - MUSE: NORMAL

## 2017-02-09 RX ORDER — CIPROFLOXACIN 2 MG/ML
400 INJECTION, SOLUTION INTRAVENOUS
Status: CANCELLED | OUTPATIENT
Start: 2017-02-09

## 2017-02-09 RX ORDER — PHENAZOPYRIDINE HYDROCHLORIDE 200 MG/1
200 TABLET, FILM COATED ORAL ONCE
Status: CANCELLED | OUTPATIENT
Start: 2017-02-09 | End: 2017-02-09

## 2017-02-09 RX ORDER — CIPROFLOXACIN 2 MG/ML
400 INJECTION, SOLUTION INTRAVENOUS SEE ADMIN INSTRUCTIONS
Status: CANCELLED | OUTPATIENT
Start: 2017-02-09

## 2017-02-14 ENCOUNTER — ANESTHESIA (OUTPATIENT)
Dept: SURGERY | Facility: CLINIC | Age: 62
DRG: 737 | End: 2017-02-14
Payer: COMMERCIAL

## 2017-02-14 ENCOUNTER — HOSPITAL ENCOUNTER (INPATIENT)
Facility: CLINIC | Age: 62
LOS: 3 days | Discharge: HOME | DRG: 737 | End: 2017-02-17
Attending: OBSTETRICS & GYNECOLOGY | Admitting: OBSTETRICS & GYNECOLOGY
Payer: COMMERCIAL

## 2017-02-14 ENCOUNTER — SURGERY (OUTPATIENT)
Age: 62
End: 2017-02-14
Payer: COMMERCIAL

## 2017-02-14 DIAGNOSIS — Z90.710 S/P ABDOMINAL HYSTERECTOMY: Primary | ICD-10-CM

## 2017-02-14 DIAGNOSIS — C56.9 MALIGNANT NEOPLASM OF OVARY (H): ICD-10-CM

## 2017-02-14 LAB
ABO + RH BLD: NORMAL
ABO + RH BLD: NORMAL
APTT PPP: 28 SEC (ref 22–37)
BASE DEFICIT BLDV-SCNC: 0.1 MMOL/L
BASE EXCESS BLDV CALC-SCNC: 0.1 MMOL/L
BLD GP AB SCN SERPL QL: NORMAL
BLD PROD TYP BPU: NORMAL
BLD UNIT ID BPU: 0
BLD UNIT ID BPU: 0
BLOOD BANK CMNT PATIENT-IMP: NORMAL
BLOOD PRODUCT CODE: NORMAL
BLOOD PRODUCT CODE: NORMAL
BPU ID: NORMAL
BPU ID: NORMAL
CA-I BLD-MCNC: 4.4 MG/DL (ref 4.4–5.2)
CA-I BLD-MCNC: 4.7 MG/DL (ref 4.4–5.2)
CREAT SERPL-MCNC: 0.51 MG/DL (ref 0.52–1.04)
ERYTHROCYTE [DISTWIDTH] IN BLOOD BY AUTOMATED COUNT: 14.5 % (ref 10–15)
FIBRINOGEN PPP-MCNC: 266 MG/DL (ref 200–420)
GFR SERPL CREATININE-BSD FRML MDRD: ABNORMAL ML/MIN/1.7M2
GLUCOSE BLD-MCNC: 136 MG/DL (ref 70–99)
GLUCOSE BLD-MCNC: 174 MG/DL (ref 70–99)
HCO3 BLDV-SCNC: 25 MMOL/L (ref 21–28)
HCO3 BLDV-SCNC: 25 MMOL/L (ref 21–28)
HCT VFR BLD AUTO: 28.9 % (ref 35–47)
HGB BLD-MCNC: 11 G/DL (ref 11.7–15.7)
HGB BLD-MCNC: 9 G/DL (ref 11.7–15.7)
HGB BLD-MCNC: 9.8 G/DL (ref 11.7–15.7)
INR PPP: 1.29 (ref 0.86–1.14)
MCH RBC QN AUTO: 28.3 PG (ref 26.5–33)
MCHC RBC AUTO-ENTMCNC: 31.1 G/DL (ref 31.5–36.5)
MCV RBC AUTO: 91 FL (ref 78–100)
NUM BPU REQUESTED: 2
O2/TOTAL GAS SETTING VFR VENT: 52 %
O2/TOTAL GAS SETTING VFR VENT: 53 %
PCO2 BLDV: 43 MM HG (ref 40–50)
PCO2 BLDV: 45 MM HG (ref 40–50)
PH BLDV: 7.36 PH (ref 7.32–7.43)
PH BLDV: 7.38 PH (ref 7.32–7.43)
PLATELET # BLD AUTO: 404 10E9/L (ref 150–450)
PO2 BLDV: 46 MM HG (ref 25–47)
PO2 BLDV: 78 MM HG (ref 25–47)
POTASSIUM BLD-SCNC: 3.5 MMOL/L (ref 3.4–5.3)
POTASSIUM BLD-SCNC: 3.6 MMOL/L (ref 3.4–5.3)
RBC # BLD AUTO: 3.18 10E12/L (ref 3.8–5.2)
SODIUM BLD-SCNC: 137 MMOL/L (ref 133–144)
SODIUM BLD-SCNC: 138 MMOL/L (ref 133–144)
SPECIMEN EXP DATE BLD: NORMAL
TRANSFUSION STATUS PATIENT QL: NORMAL
WBC # BLD AUTO: 15.3 10E9/L (ref 4–11)

## 2017-02-14 PROCEDURE — 86850 RBC ANTIBODY SCREEN: CPT | Performed by: NURSE PRACTITIONER

## 2017-02-14 PROCEDURE — 0DJD8ZZ INSPECTION OF LOWER INTESTINAL TRACT, VIA NATURAL OR ARTIFICIAL OPENING ENDOSCOPIC: ICD-10-PCS | Performed by: OBSTETRICS & GYNECOLOGY

## 2017-02-14 PROCEDURE — 88331 PATH CONSLTJ SURG 1 BLK 1SPC: CPT | Performed by: OBSTETRICS & GYNECOLOGY

## 2017-02-14 PROCEDURE — 25000125 ZZHC RX 250: Performed by: OBSTETRICS & GYNECOLOGY

## 2017-02-14 PROCEDURE — 82330 ASSAY OF CALCIUM: CPT | Performed by: ANESTHESIOLOGY

## 2017-02-14 PROCEDURE — P9041 ALBUMIN (HUMAN),5%, 50ML: HCPCS | Performed by: NURSE ANESTHETIST, CERTIFIED REGISTERED

## 2017-02-14 PROCEDURE — 82803 BLOOD GASES ANY COMBINATION: CPT | Performed by: ANESTHESIOLOGY

## 2017-02-14 PROCEDURE — 82947 ASSAY GLUCOSE BLOOD QUANT: CPT | Performed by: OBSTETRICS & GYNECOLOGY

## 2017-02-14 PROCEDURE — 27210995 ZZH RX 272: Performed by: OBSTETRICS & GYNECOLOGY

## 2017-02-14 PROCEDURE — 58954 TAH RAD DEBULK/LYMPH REMOVE: CPT | Mod: GC | Performed by: OBSTETRICS & GYNECOLOGY

## 2017-02-14 PROCEDURE — 25000128 H RX IP 250 OP 636: Performed by: NURSE ANESTHETIST, CERTIFIED REGISTERED

## 2017-02-14 PROCEDURE — 0UT90ZZ RESECTION OF UTERUS, OPEN APPROACH: ICD-10-PCS | Performed by: OBSTETRICS & GYNECOLOGY

## 2017-02-14 PROCEDURE — 25000566 ZZH SEVOFLURANE, EA 15 MIN: Performed by: OBSTETRICS & GYNECOLOGY

## 2017-02-14 PROCEDURE — 37000009 ZZH ANESTHESIA TECHNICAL FEE, EACH ADDTL 15 MIN: Performed by: OBSTETRICS & GYNECOLOGY

## 2017-02-14 PROCEDURE — 36000064 ZZH SURGERY LEVEL 4 EA 15 ADDTL MIN - UMMC: Performed by: OBSTETRICS & GYNECOLOGY

## 2017-02-14 PROCEDURE — 25000125 ZZHC RX 250: Performed by: ANESTHESIOLOGY

## 2017-02-14 PROCEDURE — 25000128 H RX IP 250 OP 636: Performed by: OBSTETRICS & GYNECOLOGY

## 2017-02-14 PROCEDURE — C1788 PORT, INDWELLING, IMP: HCPCS | Performed by: OBSTETRICS & GYNECOLOGY

## 2017-02-14 PROCEDURE — 0DTS0ZZ RESECTION OF GREATER OMENTUM, OPEN APPROACH: ICD-10-PCS | Performed by: OBSTETRICS & GYNECOLOGY

## 2017-02-14 PROCEDURE — 82330 ASSAY OF CALCIUM: CPT | Performed by: OBSTETRICS & GYNECOLOGY

## 2017-02-14 PROCEDURE — 37000008 ZZH ANESTHESIA TECHNICAL FEE, 1ST 30 MIN: Performed by: OBSTETRICS & GYNECOLOGY

## 2017-02-14 PROCEDURE — 25000125 ZZHC RX 250: Performed by: NURSE ANESTHETIST, CERTIFIED REGISTERED

## 2017-02-14 PROCEDURE — 88309 TISSUE EXAM BY PATHOLOGIST: CPT | Performed by: OBSTETRICS & GYNECOLOGY

## 2017-02-14 PROCEDURE — 71000014 ZZH RECOVERY PHASE 1 LEVEL 2 FIRST HR: Performed by: OBSTETRICS & GYNECOLOGY

## 2017-02-14 PROCEDURE — 49419 INSERT TUN IP CATH W/PORT: CPT | Mod: GC | Performed by: OBSTETRICS & GYNECOLOGY

## 2017-02-14 PROCEDURE — 0W9G3ZX DRAINAGE OF PERITONEAL CAVITY, PERCUTANEOUS APPROACH, DIAGNOSTIC: ICD-10-PCS | Performed by: OBSTETRICS & GYNECOLOGY

## 2017-02-14 PROCEDURE — 0WHG03Z INSERTION OF INFUSION DEVICE INTO PERITONEAL CAVITY, OPEN APPROACH: ICD-10-PCS | Performed by: OBSTETRICS & GYNECOLOGY

## 2017-02-14 PROCEDURE — 84132 ASSAY OF SERUM POTASSIUM: CPT | Performed by: OBSTETRICS & GYNECOLOGY

## 2017-02-14 PROCEDURE — 82803 BLOOD GASES ANY COMBINATION: CPT | Performed by: OBSTETRICS & GYNECOLOGY

## 2017-02-14 PROCEDURE — 27210794 ZZH OR GENERAL SUPPLY STERILE: Performed by: OBSTETRICS & GYNECOLOGY

## 2017-02-14 PROCEDURE — 40000171 ZZH STATISTIC PRE-PROCEDURE ASSESSMENT III: Performed by: OBSTETRICS & GYNECOLOGY

## 2017-02-14 PROCEDURE — 0UT20ZZ RESECTION OF BILATERAL OVARIES, OPEN APPROACH: ICD-10-PCS | Performed by: OBSTETRICS & GYNECOLOGY

## 2017-02-14 PROCEDURE — C9290 INJ, BUPIVACAINE LIPOSOME: HCPCS | Performed by: ANESTHESIOLOGY

## 2017-02-14 PROCEDURE — 25800025 ZZH RX 258: Performed by: NURSE ANESTHETIST, CERTIFIED REGISTERED

## 2017-02-14 PROCEDURE — 85610 PROTHROMBIN TIME: CPT | Performed by: OBSTETRICS & GYNECOLOGY

## 2017-02-14 PROCEDURE — 0DBN0ZZ EXCISION OF SIGMOID COLON, OPEN APPROACH: ICD-10-PCS | Performed by: OBSTETRICS & GYNECOLOGY

## 2017-02-14 PROCEDURE — 0UT70ZZ RESECTION OF BILATERAL FALLOPIAN TUBES, OPEN APPROACH: ICD-10-PCS | Performed by: OBSTETRICS & GYNECOLOGY

## 2017-02-14 PROCEDURE — 82947 ASSAY GLUCOSE BLOOD QUANT: CPT | Performed by: ANESTHESIOLOGY

## 2017-02-14 PROCEDURE — 88112 CYTOPATH CELL ENHANCE TECH: CPT | Performed by: OBSTETRICS & GYNECOLOGY

## 2017-02-14 PROCEDURE — 0UTC0ZZ RESECTION OF CERVIX, OPEN APPROACH: ICD-10-PCS | Performed by: OBSTETRICS & GYNECOLOGY

## 2017-02-14 PROCEDURE — 25000128 H RX IP 250 OP 636: Performed by: ANESTHESIOLOGY

## 2017-02-14 PROCEDURE — 85384 FIBRINOGEN ACTIVITY: CPT | Performed by: OBSTETRICS & GYNECOLOGY

## 2017-02-14 PROCEDURE — 84295 ASSAY OF SERUM SODIUM: CPT | Performed by: OBSTETRICS & GYNECOLOGY

## 2017-02-14 PROCEDURE — 25000132 ZZH RX MED GY IP 250 OP 250 PS 637: Performed by: OBSTETRICS & GYNECOLOGY

## 2017-02-14 PROCEDURE — 84132 ASSAY OF SERUM POTASSIUM: CPT | Performed by: ANESTHESIOLOGY

## 2017-02-14 PROCEDURE — 85730 THROMBOPLASTIN TIME PARTIAL: CPT | Performed by: OBSTETRICS & GYNECOLOGY

## 2017-02-14 PROCEDURE — 36000062 ZZH SURGERY LEVEL 4 1ST 30 MIN - UMMC: Performed by: OBSTETRICS & GYNECOLOGY

## 2017-02-14 PROCEDURE — C9399 UNCLASSIFIED DRUGS OR BIOLOG: HCPCS | Performed by: NURSE ANESTHETIST, CERTIFIED REGISTERED

## 2017-02-14 PROCEDURE — 86900 BLOOD TYPING SEROLOGIC ABO: CPT | Performed by: NURSE PRACTITIONER

## 2017-02-14 PROCEDURE — 00000155 ZZHCL STATISTIC H-CELL BLOCK W/STAIN: Performed by: OBSTETRICS & GYNECOLOGY

## 2017-02-14 PROCEDURE — P9016 RBC LEUKOCYTES REDUCED: HCPCS | Performed by: NURSE PRACTITIONER

## 2017-02-14 PROCEDURE — 88307 TISSUE EXAM BY PATHOLOGIST: CPT | Performed by: OBSTETRICS & GYNECOLOGY

## 2017-02-14 PROCEDURE — 36415 COLL VENOUS BLD VENIPUNCTURE: CPT | Performed by: INTERNAL MEDICINE

## 2017-02-14 PROCEDURE — 85027 COMPLETE CBC AUTOMATED: CPT | Performed by: OBSTETRICS & GYNECOLOGY

## 2017-02-14 PROCEDURE — 07BC0ZX EXCISION OF PELVIS LYMPHATIC, OPEN APPROACH, DIAGNOSTIC: ICD-10-PCS | Performed by: OBSTETRICS & GYNECOLOGY

## 2017-02-14 PROCEDURE — 36415 COLL VENOUS BLD VENIPUNCTURE: CPT | Performed by: OBSTETRICS & GYNECOLOGY

## 2017-02-14 PROCEDURE — 86901 BLOOD TYPING SEROLOGIC RH(D): CPT | Performed by: NURSE PRACTITIONER

## 2017-02-14 PROCEDURE — 12000001 ZZH R&B MED SURG/OB UMMC

## 2017-02-14 PROCEDURE — 25800025 ZZH RX 258: Performed by: OBSTETRICS & GYNECOLOGY

## 2017-02-14 PROCEDURE — 0TJB8ZZ INSPECTION OF BLADDER, VIA NATURAL OR ARTIFICIAL OPENING ENDOSCOPIC: ICD-10-PCS | Performed by: OBSTETRICS & GYNECOLOGY

## 2017-02-14 PROCEDURE — 88305 TISSUE EXAM BY PATHOLOGIST: CPT | Performed by: OBSTETRICS & GYNECOLOGY

## 2017-02-14 PROCEDURE — 82565 ASSAY OF CREATININE: CPT | Performed by: INTERNAL MEDICINE

## 2017-02-14 PROCEDURE — 86923 COMPATIBILITY TEST ELECTRIC: CPT | Performed by: NURSE PRACTITIONER

## 2017-02-14 PROCEDURE — 84295 ASSAY OF SERUM SODIUM: CPT | Performed by: ANESTHESIOLOGY

## 2017-02-14 PROCEDURE — 0KBP0ZX EXCISION OF LEFT HIP MUSCLE, OPEN APPROACH, DIAGNOSTIC: ICD-10-PCS | Performed by: OBSTETRICS & GYNECOLOGY

## 2017-02-14 DEVICE — CATH PORT MRI POWERPORT W/MICRO INTRO 8FR SL 1808070: Type: IMPLANTABLE DEVICE | Site: ABDOMEN | Status: FUNCTIONAL

## 2017-02-14 RX ORDER — SIMETHICONE 80 MG
80 TABLET,CHEWABLE ORAL 4 TIMES DAILY PRN
Status: DISCONTINUED | OUTPATIENT
Start: 2017-02-14 | End: 2017-02-17 | Stop reason: HOSPADM

## 2017-02-14 RX ORDER — ONDANSETRON 4 MG/1
4 TABLET, ORALLY DISINTEGRATING ORAL EVERY 8 HOURS
Status: COMPLETED | OUTPATIENT
Start: 2017-02-14 | End: 2017-02-15

## 2017-02-14 RX ORDER — HYDROMORPHONE HYDROCHLORIDE 1 MG/ML
.3-.5 INJECTION, SOLUTION INTRAMUSCULAR; INTRAVENOUS; SUBCUTANEOUS
Status: DISCONTINUED | OUTPATIENT
Start: 2017-02-14 | End: 2017-02-17 | Stop reason: HOSPADM

## 2017-02-14 RX ORDER — SODIUM CHLORIDE, SODIUM LACTATE, POTASSIUM CHLORIDE, CALCIUM CHLORIDE 600; 310; 30; 20 MG/100ML; MG/100ML; MG/100ML; MG/100ML
INJECTION, SOLUTION INTRAVENOUS CONTINUOUS
Status: DISCONTINUED | OUTPATIENT
Start: 2017-02-14 | End: 2017-02-14 | Stop reason: HOSPADM

## 2017-02-14 RX ORDER — NALOXONE HYDROCHLORIDE 0.4 MG/ML
.1-.4 INJECTION, SOLUTION INTRAMUSCULAR; INTRAVENOUS; SUBCUTANEOUS
Status: DISCONTINUED | OUTPATIENT
Start: 2017-02-14 | End: 2017-02-17 | Stop reason: HOSPADM

## 2017-02-14 RX ORDER — BISACODYL 10 MG
10 SUPPOSITORY, RECTAL RECTAL DAILY
Status: DISCONTINUED | OUTPATIENT
Start: 2017-02-15 | End: 2017-02-17 | Stop reason: HOSPADM

## 2017-02-14 RX ORDER — NICOTINE 21 MG/24HR
1 PATCH, TRANSDERMAL 24 HOURS TRANSDERMAL DAILY
Status: DISCONTINUED | OUTPATIENT
Start: 2017-02-14 | End: 2017-02-17 | Stop reason: HOSPADM

## 2017-02-14 RX ORDER — LIDOCAINE 40 MG/G
CREAM TOPICAL
Status: DISCONTINUED | OUTPATIENT
Start: 2017-02-14 | End: 2017-02-14 | Stop reason: HOSPADM

## 2017-02-14 RX ORDER — ALBUTEROL SULFATE 90 UG/1
2 AEROSOL, METERED RESPIRATORY (INHALATION) DAILY PRN
Status: DISCONTINUED | OUTPATIENT
Start: 2017-02-14 | End: 2017-02-17 | Stop reason: HOSPADM

## 2017-02-14 RX ORDER — SODIUM CHLORIDE, SODIUM LACTATE, POTASSIUM CHLORIDE, CALCIUM CHLORIDE 600; 310; 30; 20 MG/100ML; MG/100ML; MG/100ML; MG/100ML
INJECTION, SOLUTION INTRAVENOUS CONTINUOUS PRN
Status: DISCONTINUED | OUTPATIENT
Start: 2017-02-14 | End: 2017-02-14

## 2017-02-14 RX ORDER — PROPOFOL 10 MG/ML
INJECTION, EMULSION INTRAVENOUS PRN
Status: DISCONTINUED | OUTPATIENT
Start: 2017-02-14 | End: 2017-02-14

## 2017-02-14 RX ORDER — ONDANSETRON 4 MG/1
4 TABLET, ORALLY DISINTEGRATING ORAL EVERY 8 HOURS PRN
Status: DISCONTINUED | OUTPATIENT
Start: 2017-02-15 | End: 2017-02-17 | Stop reason: HOSPADM

## 2017-02-14 RX ORDER — ONDANSETRON 4 MG/1
4 TABLET, ORALLY DISINTEGRATING ORAL EVERY 30 MIN PRN
Status: DISCONTINUED | OUTPATIENT
Start: 2017-02-14 | End: 2017-02-14 | Stop reason: HOSPADM

## 2017-02-14 RX ORDER — ONDANSETRON 2 MG/ML
INJECTION INTRAMUSCULAR; INTRAVENOUS PRN
Status: DISCONTINUED | OUTPATIENT
Start: 2017-02-14 | End: 2017-02-14

## 2017-02-14 RX ORDER — NALOXONE HYDROCHLORIDE 0.4 MG/ML
.1-.4 INJECTION, SOLUTION INTRAMUSCULAR; INTRAVENOUS; SUBCUTANEOUS
Status: DISCONTINUED | OUTPATIENT
Start: 2017-02-14 | End: 2017-02-14 | Stop reason: HOSPADM

## 2017-02-14 RX ORDER — FENTANYL CITRATE 50 UG/ML
INJECTION, SOLUTION INTRAMUSCULAR; INTRAVENOUS PRN
Status: DISCONTINUED | OUTPATIENT
Start: 2017-02-14 | End: 2017-02-14

## 2017-02-14 RX ORDER — HEPARIN SODIUM 5000 [USP'U]/.5ML
5000 INJECTION, SOLUTION INTRAVENOUS; SUBCUTANEOUS
Status: COMPLETED | OUTPATIENT
Start: 2017-02-14 | End: 2017-02-14

## 2017-02-14 RX ORDER — CIPROFLOXACIN 2 MG/ML
400 INJECTION, SOLUTION INTRAVENOUS
Status: COMPLETED | OUTPATIENT
Start: 2017-02-14 | End: 2017-02-14

## 2017-02-14 RX ORDER — ONDANSETRON 2 MG/ML
4 INJECTION INTRAMUSCULAR; INTRAVENOUS EVERY 30 MIN PRN
Status: DISCONTINUED | OUTPATIENT
Start: 2017-02-14 | End: 2017-02-14 | Stop reason: HOSPADM

## 2017-02-14 RX ORDER — CIPROFLOXACIN 2 MG/ML
400 INJECTION, SOLUTION INTRAVENOUS SEE ADMIN INSTRUCTIONS
Status: DISCONTINUED | OUTPATIENT
Start: 2017-02-14 | End: 2017-02-14 | Stop reason: HOSPADM

## 2017-02-14 RX ORDER — FENTANYL CITRATE 50 UG/ML
25-50 INJECTION, SOLUTION INTRAMUSCULAR; INTRAVENOUS
Status: DISCONTINUED | OUTPATIENT
Start: 2017-02-14 | End: 2017-02-14 | Stop reason: HOSPADM

## 2017-02-14 RX ORDER — DEXAMETHASONE SODIUM PHOSPHATE 4 MG/ML
INJECTION, SOLUTION INTRA-ARTICULAR; INTRALESIONAL; INTRAMUSCULAR; INTRAVENOUS; SOFT TISSUE PRN
Status: DISCONTINUED | OUTPATIENT
Start: 2017-02-14 | End: 2017-02-14

## 2017-02-14 RX ORDER — HEPARIN SODIUM (PORCINE) LOCK FLUSH IV SOLN 100 UNIT/ML 100 UNIT/ML
SOLUTION INTRAVENOUS PRN
Status: DISCONTINUED | OUTPATIENT
Start: 2017-02-14 | End: 2017-02-14 | Stop reason: HOSPADM

## 2017-02-14 RX ORDER — EPHEDRINE SULFATE 50 MG/ML
INJECTION, SOLUTION INTRAMUSCULAR; INTRAVENOUS; SUBCUTANEOUS PRN
Status: DISCONTINUED | OUTPATIENT
Start: 2017-02-14 | End: 2017-02-14

## 2017-02-14 RX ORDER — OXYCODONE HYDROCHLORIDE 5 MG/1
5-10 TABLET ORAL
Status: DISCONTINUED | OUTPATIENT
Start: 2017-02-14 | End: 2017-02-17 | Stop reason: HOSPADM

## 2017-02-14 RX ORDER — LIDOCAINE HYDROCHLORIDE 20 MG/ML
INJECTION, SOLUTION INFILTRATION; PERINEURAL PRN
Status: DISCONTINUED | OUTPATIENT
Start: 2017-02-14 | End: 2017-02-14

## 2017-02-14 RX ORDER — HYDROXYZINE HYDROCHLORIDE 25 MG/1
25 TABLET, FILM COATED ORAL EVERY 6 HOURS PRN
Status: DISCONTINUED | OUTPATIENT
Start: 2017-02-14 | End: 2017-02-17 | Stop reason: HOSPADM

## 2017-02-14 RX ORDER — PHENAZOPYRIDINE HYDROCHLORIDE 200 MG/1
200 TABLET, FILM COATED ORAL ONCE
Status: COMPLETED | OUTPATIENT
Start: 2017-02-14 | End: 2017-02-14

## 2017-02-14 RX ORDER — IPRATROPIUM BROMIDE AND ALBUTEROL SULFATE 2.5; .5 MG/3ML; MG/3ML
3 SOLUTION RESPIRATORY (INHALATION) ONCE
Status: COMPLETED | OUTPATIENT
Start: 2017-02-14 | End: 2017-02-14

## 2017-02-14 RX ORDER — BUPIVACAINE HYDROCHLORIDE AND EPINEPHRINE 2.5; 5 MG/ML; UG/ML
INJECTION, SOLUTION INFILTRATION; PERINEURAL PRN
Status: DISCONTINUED | OUTPATIENT
Start: 2017-02-14 | End: 2017-02-14

## 2017-02-14 RX ORDER — FLUMAZENIL 0.1 MG/ML
0.2 INJECTION, SOLUTION INTRAVENOUS
Status: DISCONTINUED | OUTPATIENT
Start: 2017-02-14 | End: 2017-02-14 | Stop reason: HOSPADM

## 2017-02-14 RX ORDER — ALBUMIN, HUMAN INJ 5% 5 %
SOLUTION INTRAVENOUS CONTINUOUS PRN
Status: DISCONTINUED | OUTPATIENT
Start: 2017-02-14 | End: 2017-02-14

## 2017-02-14 RX ORDER — HYDROMORPHONE HYDROCHLORIDE 1 MG/ML
.3-.5 INJECTION, SOLUTION INTRAMUSCULAR; INTRAVENOUS; SUBCUTANEOUS EVERY 5 MIN PRN
Status: DISCONTINUED | OUTPATIENT
Start: 2017-02-14 | End: 2017-02-14 | Stop reason: HOSPADM

## 2017-02-14 RX ORDER — ACETAMINOPHEN 325 MG/1
650 TABLET ORAL EVERY 6 HOURS
Status: DISCONTINUED | OUTPATIENT
Start: 2017-02-14 | End: 2017-02-17 | Stop reason: HOSPADM

## 2017-02-14 RX ORDER — LIDOCAINE 40 MG/G
CREAM TOPICAL
Status: DISCONTINUED | OUTPATIENT
Start: 2017-02-14 | End: 2017-02-17 | Stop reason: HOSPADM

## 2017-02-14 RX ORDER — SODIUM CHLORIDE, SODIUM LACTATE, POTASSIUM CHLORIDE, CALCIUM CHLORIDE 600; 310; 30; 20 MG/100ML; MG/100ML; MG/100ML; MG/100ML
INJECTION, SOLUTION INTRAVENOUS CONTINUOUS
Status: DISCONTINUED | OUTPATIENT
Start: 2017-02-14 | End: 2017-02-15

## 2017-02-14 RX ORDER — POLYETHYLENE GLYCOL 3350 17 G/17G
17 POWDER, FOR SOLUTION ORAL DAILY PRN
Status: DISCONTINUED | OUTPATIENT
Start: 2017-02-14 | End: 2017-02-17 | Stop reason: HOSPADM

## 2017-02-14 RX ORDER — AMOXICILLIN 250 MG
1-2 CAPSULE ORAL 2 TIMES DAILY
Status: DISCONTINUED | OUTPATIENT
Start: 2017-02-14 | End: 2017-02-17 | Stop reason: HOSPADM

## 2017-02-14 RX ADMIN — PHENYLEPHRINE HYDROCHLORIDE 100 MCG: 10 INJECTION, SOLUTION INTRAMUSCULAR; INTRAVENOUS; SUBCUTANEOUS at 15:49

## 2017-02-14 RX ADMIN — ONDANSETRON 4 MG: 2 INJECTION INTRAMUSCULAR; INTRAVENOUS at 18:30

## 2017-02-14 RX ADMIN — OXYCODONE HYDROCHLORIDE 10 MG: 5 TABLET ORAL at 21:03

## 2017-02-14 RX ADMIN — SODIUM CHLORIDE, POTASSIUM CHLORIDE, SODIUM LACTATE AND CALCIUM CHLORIDE: 600; 310; 30; 20 INJECTION, SOLUTION INTRAVENOUS at 15:35

## 2017-02-14 RX ADMIN — ROCURONIUM BROMIDE 10 MG: 10 INJECTION INTRAVENOUS at 15:43

## 2017-02-14 RX ADMIN — NICOTINE 1 PATCH: 14 PATCH, EXTENDED RELEASE TRANSDERMAL at 21:49

## 2017-02-14 RX ADMIN — CIPROFLOXACIN 400 MG: 2 INJECTION INTRAVENOUS at 18:30

## 2017-02-14 RX ADMIN — DEXAMETHASONE SODIUM PHOSPHATE 6 MG: 4 INJECTION, SOLUTION INTRAMUSCULAR; INTRAVENOUS at 17:00

## 2017-02-14 RX ADMIN — FENTANYL CITRATE 25 MCG: 50 INJECTION, SOLUTION INTRAMUSCULAR; INTRAVENOUS at 19:45

## 2017-02-14 RX ADMIN — HYDROMORPHONE HYDROCHLORIDE 0.3 MG: 10 INJECTION, SOLUTION INTRAMUSCULAR; INTRAVENOUS; SUBCUTANEOUS at 21:55

## 2017-02-14 RX ADMIN — HEPARIN SODIUM 5000 UNITS: 10000 INJECTION, SOLUTION INTRAVENOUS; SUBCUTANEOUS at 13:28

## 2017-02-14 RX ADMIN — HYDROMORPHONE HYDROCHLORIDE 0.2 MG: 10 INJECTION, SOLUTION INTRAMUSCULAR; INTRAVENOUS; SUBCUTANEOUS at 20:05

## 2017-02-14 RX ADMIN — MIDAZOLAM HYDROCHLORIDE 2 MG: 1 INJECTION, SOLUTION INTRAMUSCULAR; INTRAVENOUS at 13:36

## 2017-02-14 RX ADMIN — PHENYLEPHRINE HYDROCHLORIDE 100 MCG: 10 INJECTION, SOLUTION INTRAMUSCULAR; INTRAVENOUS; SUBCUTANEOUS at 16:06

## 2017-02-14 RX ADMIN — ROCURONIUM BROMIDE 40 MG: 10 INJECTION INTRAVENOUS at 13:57

## 2017-02-14 RX ADMIN — FENTANYL CITRATE 50 MCG: 50 INJECTION, SOLUTION INTRAMUSCULAR; INTRAVENOUS at 17:51

## 2017-02-14 RX ADMIN — Medication 5 MG: at 14:23

## 2017-02-14 RX ADMIN — SODIUM CHLORIDE, PRESERVATIVE FREE 10 ML: 5 INJECTION INTRAVENOUS at 18:49

## 2017-02-14 RX ADMIN — ALBUMIN (HUMAN): 12.5 SOLUTION INTRAVENOUS at 18:03

## 2017-02-14 RX ADMIN — Medication 5 MG: at 14:24

## 2017-02-14 RX ADMIN — SODIUM CHLORIDE, POTASSIUM CHLORIDE, SODIUM LACTATE AND CALCIUM CHLORIDE: 600; 310; 30; 20 INJECTION, SOLUTION INTRAVENOUS at 21:49

## 2017-02-14 RX ADMIN — SENNOSIDES AND DOCUSATE SODIUM 1 TABLET: 8.6; 5 TABLET ORAL at 21:04

## 2017-02-14 RX ADMIN — ACETAMINOPHEN 650 MG: 325 TABLET, FILM COATED ORAL at 21:02

## 2017-02-14 RX ADMIN — SUCCINYLCHOLINE CHLORIDE 100 MG: 20 INJECTION, SOLUTION INTRAMUSCULAR; INTRAVENOUS at 13:45

## 2017-02-14 RX ADMIN — FENTANYL CITRATE 50 MCG: 50 INJECTION, SOLUTION INTRAMUSCULAR; INTRAVENOUS at 17:15

## 2017-02-14 RX ADMIN — ALBUMIN (HUMAN): 12.5 SOLUTION INTRAVENOUS at 14:24

## 2017-02-14 RX ADMIN — PHENYLEPHRINE HYDROCHLORIDE 100 MCG: 10 INJECTION, SOLUTION INTRAMUSCULAR; INTRAVENOUS; SUBCUTANEOUS at 15:44

## 2017-02-14 RX ADMIN — Medication 5 MG: at 15:49

## 2017-02-14 RX ADMIN — PHENYLEPHRINE HYDROCHLORIDE 100 MCG: 10 INJECTION, SOLUTION INTRAMUSCULAR; INTRAVENOUS; SUBCUTANEOUS at 16:16

## 2017-02-14 RX ADMIN — HYDROMORPHONE HYDROCHLORIDE 1 MG: 1 INJECTION, SOLUTION INTRAMUSCULAR; INTRAVENOUS; SUBCUTANEOUS at 19:03

## 2017-02-14 RX ADMIN — FENTANYL CITRATE 50 MCG: 50 INJECTION, SOLUTION INTRAMUSCULAR; INTRAVENOUS at 14:05

## 2017-02-14 RX ADMIN — LIDOCAINE HYDROCHLORIDE 80 MG: 20 INJECTION, SOLUTION INFILTRATION; PERINEURAL at 13:45

## 2017-02-14 RX ADMIN — PHENYLEPHRINE HYDROCHLORIDE 100 MCG: 10 INJECTION, SOLUTION INTRAMUSCULAR; INTRAVENOUS; SUBCUTANEOUS at 16:28

## 2017-02-14 RX ADMIN — BUPIVACAINE HYDROCHLORIDE AND EPINEPHRINE BITARTRATE 20 ML: 2.5; .005 INJECTION, SOLUTION INFILTRATION; PERINEURAL at 13:28

## 2017-02-14 RX ADMIN — FENTANYL CITRATE 50 MCG: 50 INJECTION, SOLUTION INTRAMUSCULAR; INTRAVENOUS at 17:07

## 2017-02-14 RX ADMIN — IPRATROPIUM BROMIDE AND ALBUTEROL SULFATE 3 ML: .5; 3 SOLUTION RESPIRATORY (INHALATION) at 13:29

## 2017-02-14 RX ADMIN — PHENYLEPHRINE HYDROCHLORIDE 100 MCG: 10 INJECTION, SOLUTION INTRAMUSCULAR; INTRAVENOUS; SUBCUTANEOUS at 16:11

## 2017-02-14 RX ADMIN — SODIUM CHLORIDE, POTASSIUM CHLORIDE, SODIUM LACTATE AND CALCIUM CHLORIDE: 600; 310; 30; 20 INJECTION, SOLUTION INTRAVENOUS at 19:01

## 2017-02-14 RX ADMIN — ALBUMIN (HUMAN): 12.5 SOLUTION INTRAVENOUS at 16:18

## 2017-02-14 RX ADMIN — METRONIDAZOLE 500 MG: 500 INJECTION, SOLUTION INTRAVENOUS at 13:51

## 2017-02-14 RX ADMIN — FENTANYL CITRATE 50 MCG: 50 INJECTION, SOLUTION INTRAMUSCULAR; INTRAVENOUS at 19:49

## 2017-02-14 RX ADMIN — Medication 5 MG: at 14:53

## 2017-02-14 RX ADMIN — SODIUM CHLORIDE, POTASSIUM CHLORIDE, SODIUM LACTATE AND CALCIUM CHLORIDE: 600; 310; 30; 20 INJECTION, SOLUTION INTRAVENOUS at 13:36

## 2017-02-14 RX ADMIN — ROCURONIUM BROMIDE 20 MG: 10 INJECTION INTRAVENOUS at 14:44

## 2017-02-14 RX ADMIN — ALBUMIN (HUMAN): 12.5 SOLUTION INTRAVENOUS at 16:06

## 2017-02-14 RX ADMIN — MIDAZOLAM 1 MG: 1 INJECTION INTRAMUSCULAR; INTRAVENOUS at 13:20

## 2017-02-14 RX ADMIN — ROCURONIUM BROMIDE 10 MG: 10 INJECTION INTRAVENOUS at 18:24

## 2017-02-14 RX ADMIN — FENTANYL CITRATE 50 MCG: 50 INJECTION, SOLUTION INTRAMUSCULAR; INTRAVENOUS at 17:23

## 2017-02-14 RX ADMIN — FENTANYL CITRATE 50 MCG: 50 INJECTION, SOLUTION INTRAMUSCULAR; INTRAVENOUS at 13:20

## 2017-02-14 RX ADMIN — PHENAZOPYRIDINE HYDROCHLORIDE 200 MG: 200 TABLET ORAL at 12:24

## 2017-02-14 RX ADMIN — Medication 1 PAD.: at 18:07

## 2017-02-14 RX ADMIN — HYDROMORPHONE HYDROCHLORIDE 0.3 MG: 10 INJECTION, SOLUTION INTRAMUSCULAR; INTRAVENOUS; SUBCUTANEOUS at 19:58

## 2017-02-14 RX ADMIN — BUPIVACAINE 20 ML: 13.3 INJECTION, SUSPENSION, LIPOSOMAL INFILTRATION at 13:28

## 2017-02-14 RX ADMIN — ROCURONIUM BROMIDE 20 MG: 10 INJECTION INTRAVENOUS at 16:31

## 2017-02-14 RX ADMIN — FENTANYL CITRATE 25 MCG: 50 INJECTION, SOLUTION INTRAMUSCULAR; INTRAVENOUS at 19:42

## 2017-02-14 RX ADMIN — ONDANSETRON 4 MG: 4 TABLET, ORALLY DISINTEGRATING ORAL at 21:08

## 2017-02-14 RX ADMIN — PROPOFOL 200 MG: 10 INJECTION, EMULSION INTRAVENOUS at 13:45

## 2017-02-14 RX ADMIN — ROCURONIUM BROMIDE 20 MG: 10 INJECTION INTRAVENOUS at 17:29

## 2017-02-14 RX ADMIN — FENTANYL CITRATE 50 MCG: 50 INJECTION, SOLUTION INTRAMUSCULAR; INTRAVENOUS at 14:19

## 2017-02-14 RX ADMIN — CIPROFLOXACIN 400 MG: 2 INJECTION, SOLUTION INTRAVENOUS at 12:24

## 2017-02-14 RX ADMIN — SUGAMMADEX 200 MG: 100 INJECTION, SOLUTION INTRAVENOUS at 19:00

## 2017-02-14 ASSESSMENT — PAIN DESCRIPTION - DESCRIPTORS
DESCRIPTORS: ACHING
DESCRIPTORS: ACHING

## 2017-02-14 NOTE — ANESTHESIA PROCEDURE NOTES
Peripheral Nerve Block Procedure Note    Staff:     Anesthesiologist:  HONG DIXON    Resident/CRNA:  CHANI COLON    Block performed by resident/CRNA in the presence of a teaching physician    Location: Pre-op  Procedure Start/Stop TImes:      2/14/2017 1:22 PM     2/14/2017 1:27 PM    patient identified, IV checked, site marked, risks and benefits discussed, informed consent, monitors and equipment checked, pre-op evaluation, at physician/surgeon's request and post-op pain management      Correct Patient: Yes      Correct Position: Yes      Correct Site: Yes      Correct Procedure: Yes      Correct Laterality:  Yes    Site Marked:  Yes  Procedure details:     Procedure:  TAP    ASA:  2    Diagnosis:  Open hysterectomy    Laterality:  Bilateral    Position:  Supine    Sterile Prep: chloraprep, mask and sterile gloves      Local skin infiltration:  2% lidocaine    amount (mL):  2    Needle:  Short bevel and insulated    Needle gauge:  21    Needle length (inches):  4    Ultrasound: Yes      Ultrasound used to identify targeted nerve, plexus, or vascular structure and placed a needle adjacent to it      Permanent Image entered into patiient's record      Abnormal pain on injection: No      Blood Aspirated: No      Bleeding at site: No      Bolus via:  Needle    Infusion Method:  Single Shot    Complications:  None  Assessment/Narrative:      Informed consent obtained.  All risks and benefits of the nerve block discussed with the patient.  All questions answered and all parties agreed with the plan.

## 2017-02-14 NOTE — IP AVS SNAPSHOT
[address] [address]    Phone:  [phone]                                       After Visit Summary   2/14/2017    Lucero Dsouza    MRN: [id number]           After Visit Summary Signature Page     I have received my discharge instructions, and my questions have been answered. I have discussed any challenges I see with this plan with the nurse or doctor.    ..........................................................................................................................................  Patient/Patient Representative Signature      ..........................................................................................................................................  Patient Representative Print Name and Relationship to Patient    ..................................................               ................................................  Date                                            Time    ..........................................................................................................................................  Reviewed by Signature/Title    ...................................................              ..............................................  Date                                                            Time

## 2017-02-14 NOTE — OR NURSING
Pt positioned for bi lat TAP block.  Placed on EKG, pulse oximetry, & blood pressure monitors.  HERSON Michelle present along with pre-op RN.  Time out performed.  Sedation medications given as per MAR.  Procedure started at 1320.  Pt tolerated well.  Transferred to OR.

## 2017-02-14 NOTE — IP AVS SNAPSHOT
MRN:[id number]                      After Visit Summary   2/14/2017    Lucero Dsouza    MRN: [id number]           Thank you!     Thank you for choosing [facility] for your care. Our goal is always to provide you with excellent care. Hearing back from our patients is one way we can continue to improve our services. Please take a few minutes to complete the written survey that you may receive in the mail after you visit with us. Thank you!        Patient Information     Date Of Birth          1955        About your hospital stay     You were admitted on:  February 14, 2017 You last received care in the:  [address] [place] [address]    You were discharged on:  February 17, 2017        Reason for your hospital stay       Total Abdominal Hysterectomy                  Who to Call     For medical emergencies, please call 911.  For non-urgent questions about your medical care, please call your primary care provider or clinic, [phone]  For questions related to your surgery, please call your surgery clinic        Attending Provider     Provider Juan Canada MD Oncology       Primary Care Provider Office Phone # Fax #    Uvaldo JM Griffin [phone]       [facility] [address] [facility] [address]         When to contact your care team       Call Gyn Onc team if you have any of the following:  Temperature > 100.4  Foul smelling vaginal discharge  Bleeding > 1 pad per hour x 2 hrs,   Pain not controlled by oral pain meds  Severe constipation or severe nausea or vomiting.    The following appointments are scheduled in the   Gynecology Cancer Clinic  Cancer Clinic ([facility])   Clin[facility]   [address]  [address] [place] [address]   Appointments: [phone]   Information: [phone]   If you have any other questions please call [phone] or [phone] on nights or weekends.                  After Care  Instructions     Activity       Your activity upon discharge: activity as tolerated            Diet       Follow this diet upon discharge:      Regular Diet Adult                  Follow-up Appointments     Adult [facility] Follow-up and recommended labs and tests       Appointments on [place] and/or [facility] (with [facility] or [facility] provider or service). Call [phone] if you haven't heard regarding these appointments within 7 days of discharge.                  Your next 10 appointments already scheduled     Mar 06, 2017  7:30 AM CST   (Arrive by 7:15 AM)   Return Visit with Juan Daniel MD   [facility] ([facility])    [address] [facility]  2nd Floor  [facility] [address] [phone]              Further instructions from your care team       GENERAL POST-OPERATIVE    PATIENT INSTRUCTIONS       FOLLOW-UP:     Call Surgeon if you have:    Temperature greater than 100.4  Persistent nausea and vomiting  Severe uncontrolled pain  Redness, tenderness, or signs of infection (pain, swelling, redness, odor or green/yellow discharge around the site)  Difficulty breathing, headache or visual disturbances  Hives  Persistent dizziness or light-headedness  Extreme fatigue    Any other questions or concerns you may have after discharge    In an emergency, call 911 or go to an Emergency Department at a nearby hospital    WOUND CARE INSTRUCTIONS: Keep a dry clean dressing on the wound if there is drainage. The initial bandage may be removed after 24 hours. Once the wound has quit draining you may leave it open to air. If clothing rubs against the wound or causes irritation and the wound is not draining you may cover it with a dry dressing during the daytime. Try to keep the wound dry and avoid ointments on the wound unless directed to do so. If the wound becomes bright red and painful or starts to drain infected material that is not clear, please contact your  physician immediately.     1. You may shower 48 hrs after surgery    2. No soaking in the tub     DIET: There are no dietary restrictions. You may eat any foods that you can tolerate. It is a good idea to eat a high fiber diet and take in plenty of fluids to prevent constipation. If you do become constipated you may want to take a mild laxative or take ducolax tablets on a daily basis until your bowel habits are regular. Constipation can be very uncomfortable, along with straining, after recent surgery.      ACTIVITY: You are encouraged to cough and deep breath or use your incentive spirometer if you were given one, every 15-30 minutes when awake. This will help prevent respiratory complications and low grade fevers post-operatively if you had a general anesthetic. You may want to hug a pillow when coughing and sneezing to add additional support to the surgical area, if you had abdominal or chest surgery, which will decrease pain during these times.       1. No heavy lifting >20lbs or strenuous exercise for six-eight weeks. No exercise in which you are using core muscles (yoga, pilates, swimming, weight lifting)    2. You may walk as much as you wish. You are encouraged to increase your activity each day after surgery. Stairs are okay.     3. Nothing per vagina for eight weeks. No tampons, no intercourse, no douching. You can expect some light vaginal spotting and discharge for up to six weeks. If bleeding becomes heavy, please contact the office.       MEDICATIONS: Try to take narcotic medications and anti-inflammatory medications, such as tylenol, ibuprofen, naprosyn, etc., with food. This will minimize stomach upset from the medication. Should you develop nausea and vomiting from the pain medication, or develop a rash, please discontinue the medication and contact your physician. You should not drive, make important decisions, or operate machinery when taking narcotic pain medication.      OTHER: Patients are  "often constipated after general anesthesia and surgery. The patient should continue to take stool softeners (for example, Senokot-S) for the next six weeks and consume adequate amounts of water. If the patient remains constipated or unable to pass stool, please try one or all of the following measures:    1. Milk of Magnesia 30cc twice a day as needed by mouth    2. Metamucil 2 tablespoons in 12 ounces of fluid    3. Dulcolax oral or suppositories    4. Prunes or prune juice    5. Miralax daily      QUESTIONS: Please feel free to call your physician or the hospital  if you have any questions, and they will be glad to assist you.      Additional Information     If you use hormonal birth control (such as the pill, patch, ring or implants): You'll need a second form of birth control for 7 days (condoms, a diaphragm or contraceptive foam). While in the hospital, you received a medicine called Bridion. Your normal birth control will not work as well for a week after taking this medicine.          Pending Results     No orders found from 2/12/2017 to 2/15/2017.            Statement of Approval     Ordered          02/17/17 1057  I have reviewed and agree with all the recommendations and orders detailed in this document.  EFFECTIVE NOW     Approved and electronically signed by:  Irene Camp MD             Admission Information     Date & Time Provider Department Dept. Phone    2/14/2017 Juan Daniel MD [address] [facility] [phone]      Your Vitals Were     Blood Pressure Pulse Temperature Respirations Height Weight    122/58 (BP Location: Right arm) 75 98.2  F (36.8  C) (Oral) 16 1.68 m (5' 6.14\") 59.1 kg (130 lb 4.7 oz)    Pulse Oximetry BMI (Body Mass Index)                97% 20.94 kg/m2          MyChart Information     [facility] gives you secure access to your electronic health record. If you see a primary care provider, you can also send messages to your care team and make appointments. " If you have questions, please call your primary care clinic.  If you do not have a primary care provider, please call [phone] and they will assist you.        Care EveryWhere ID     This is your Care EveryWhere ID. This could be used by other organizations to access your [facility] medical records  [id number]           Review of your medicines      START taking        Dose / Directions    acetaminophen 325 MG tablet   Commonly known as:  TYLENOL   Used for:  S/P abdominal hysterectomy        Dose:  650 mg   Take 2 tablets (650 mg) by mouth every 6 hours   Quantity:  50 tablet   Refills:  0       enoxaparin 40 MG/0.4ML injection   Commonly known as:  LOVENOX   Used for:  S/P abdominal hysterectomy        Dose:  40 mg   Inject 0.4 mLs (40 mg) Subcutaneous every 24 hours   Quantity:  25 Syringe   Refills:  0       ibuprofen 600 MG tablet   Commonly known as:  ADVIL/MOTRIN   Used for:  S/P abdominal hysterectomy        Dose:  600 mg   Take 1 tablet (600 mg) by mouth every 6 hours for 50 doses   Quantity:  50 tablet   Refills:  0       oxyCODONE 5 MG IR tablet   Commonly known as:  ROXICODONE   Used for:  S/P abdominal hysterectomy        Dose:  5-10 mg   Take 1-2 tablets (5-10 mg) by mouth every 3 hours as needed for moderate to severe pain   Quantity:  20 tablet   Refills:  0       senna-docusate 8.6-50 MG per tablet   Commonly known as:  SENOKOT-S;PERICOLACE   Used for:  S/P abdominal hysterectomy        Dose:  1-2 tablet   Take 1-2 tablets by mouth 2 times daily for 50 doses   Quantity:  50 tablet   Refills:  0         CONTINUE these medicines which have NOT CHANGED        Dose / Directions    ADVAIR DISKUS 100-50 MCG/DOSE diskus inhaler   Generic drug:  fluticasone-salmeterol        Dose:  1 puff   Inhale 1 puff into the lungs   Refills:  0       albuterol 108 (90 BASE) MCG/ACT Inhaler   Commonly known as:  PROAIR HFA/PROVENTIL HFA/VENTOLIN HFA        Dose:  2 puff   Inhale 2 puffs into the lungs daily as  needed   Refills:  0       glucosamine-chondroitin 500-400 MG Caps per capsule        Dose:  1 capsule   Take 1 capsule by mouth daily 1500 mg daily   Refills:  0       MULTIVITAMIN ADULT PO        Dose:  1 tablet   Take 1 tablet by mouth daily   Refills:  0       NICOTROL 10 MG Inhaler   Generic drug:  nicotine        Dose:  1-2 puff   Inhale 1-2 puffs into the lungs daily as needed   Refills:  0       PEPTO-BISMOL PO        Take by mouth daily   Refills:  0       * priLOSEC 20 MG CR capsule   Generic drug:  omeprazole        Dose:  20 mg   Take 20 mg by mouth daily   Refills:  0       * omeprazole 20 MG CR capsule   Commonly known as:  priLOSEC        Dose:  20 mg   Take 20 mg by mouth   Refills:  0       VITAMIN C PO        Dose:  500 mg   Take 500 mg by mouth daily   Refills:  0       * Notice:  This list has 2 medication(s) that are the same as other medications prescribed for you. Read the directions carefully, and ask your doctor or other care provider to review them with you.         Where to get your medicines      These medications were sent to [facility] [address]  [address] [facility] [address]     Phone:  [phone]     acetaminophen 325 MG tablet    enoxaparin 40 MG/0.4ML injection    ibuprofen 600 MG tablet    senna-docusate 8.6-50 MG per tablet         Some of these will need a paper prescription and others can be bought over the counter. Ask your nurse if you have questions.     Bring a paper prescription for each of these medications     oxyCODONE 5 MG IR tablet                Protect others around you: Learn how to safely use, store and throw away your medicines at www.disposemymeds.org.             Medication List: This is a list of all your medications and when to take them. Check marks below indicate your daily home schedule. Keep this list as a reference.      Medications           Morning Afternoon Evening Bedtime As Needed     acetaminophen 325 MG tablet   Commonly known as:  TYLENOL   Take 2 tablets (650 mg) by mouth every 6 hours   Last time this was given:  650 mg on 2/17/2017  6:49 AM                                ADVAIR DISKUS 100-50 MCG/DOSE diskus inhaler   Inhale 1 puff into the lungs   Generic drug:  fluticasone-salmeterol                                albuterol 108 (90 BASE) MCG/ACT Inhaler   Commonly known as:  PROAIR HFA/PROVENTIL HFA/VENTOLIN HFA   Inhale 2 puffs into the lungs daily as needed                                enoxaparin 40 MG/0.4ML injection   Commonly known as:  LOVENOX   Inject 0.4 mLs (40 mg) Subcutaneous every 24 hours   Last time this was given:  40 mg on 2/17/2017  8:03 AM                                glucosamine-chondroitin 500-400 MG Caps per capsule   Take 1 capsule by mouth daily 1500 mg daily                                ibuprofen 600 MG tablet   Commonly known as:  ADVIL/MOTRIN   Take 1 tablet (600 mg) by mouth every 6 hours for 50 doses   Last time this was given:  600 mg on 2/17/2017 10:18 AM                                MULTIVITAMIN ADULT PO   Take 1 tablet by mouth daily                                NICOTROL 10 MG Inhaler   Inhale 1-2 puffs into the lungs daily as needed   Generic drug:  nicotine                                oxyCODONE 5 MG IR tablet   Commonly known as:  ROXICODONE   Take 1-2 tablets (5-10 mg) by mouth every 3 hours as needed for moderate to severe pain   Last time this was given:  10 mg on 2/15/2017  2:37 PM                                PEPTO-BISMOL PO   Take by mouth daily                                * priLOSEC 20 MG CR capsule   Take 20 mg by mouth daily   Last time this was given:  20 mg on 2/17/2017  8:03 AM   Generic drug:  omeprazole                                * omeprazole 20 MG CR capsule   Commonly known as:  priLOSEC   Take 20 mg by mouth   Last time this was given:  20 mg on 2/17/2017  8:03 AM                                senna-docusate 8.6-50  MG per tablet   Commonly known as:  SENOKOT-S;PERICOLACE   Take 1-2 tablets by mouth 2 times daily for 50 doses   Last time this was given:  2 tablets on 2/16/2017  8:06 PM                                VITAMIN C PO   Take 500 mg by mouth daily                                * Notice:  This list has 2 medication(s) that are the same as other medications prescribed for you. Read the directions carefully, and ask your doctor or other care provider to review them with you.

## 2017-02-15 LAB
ANION GAP SERPL CALCULATED.3IONS-SCNC: 8 MMOL/L (ref 3–14)
BASOPHILS # BLD AUTO: 0 10E9/L (ref 0–0.2)
BASOPHILS NFR BLD AUTO: 0.2 %
BUN SERPL-MCNC: 6 MG/DL (ref 7–30)
CALCIUM SERPL-MCNC: 8 MG/DL (ref 8.5–10.1)
CHLORIDE SERPL-SCNC: 102 MMOL/L (ref 94–109)
CO2 SERPL-SCNC: 27 MMOL/L (ref 20–32)
CREAT SERPL-MCNC: 0.47 MG/DL (ref 0.52–1.04)
DIFFERENTIAL METHOD BLD: ABNORMAL
EOSINOPHIL # BLD AUTO: 0 10E9/L (ref 0–0.7)
EOSINOPHIL NFR BLD AUTO: 0 %
ERYTHROCYTE [DISTWIDTH] IN BLOOD BY AUTOMATED COUNT: 14.4 % (ref 10–15)
GFR SERPL CREATININE-BSD FRML MDRD: ABNORMAL ML/MIN/1.7M2
GLUCOSE SERPL-MCNC: 131 MG/DL (ref 70–99)
HCT VFR BLD AUTO: 28.4 % (ref 35–47)
HGB BLD-MCNC: 8.8 G/DL (ref 11.7–15.7)
IMM GRANULOCYTES # BLD: 0 10E9/L (ref 0–0.4)
IMM GRANULOCYTES NFR BLD: 0.2 %
INR PPP: 1.17 (ref 0.86–1.14)
LYMPHOCYTES # BLD AUTO: 1.3 10E9/L (ref 0.8–5.3)
LYMPHOCYTES NFR BLD AUTO: 9.9 %
MCH RBC QN AUTO: 28.3 PG (ref 26.5–33)
MCHC RBC AUTO-ENTMCNC: 31 G/DL (ref 31.5–36.5)
MCV RBC AUTO: 91 FL (ref 78–100)
MONOCYTES # BLD AUTO: 0.9 10E9/L (ref 0–1.3)
MONOCYTES NFR BLD AUTO: 6.7 %
NEUTROPHILS # BLD AUTO: 11 10E9/L (ref 1.6–8.3)
NEUTROPHILS NFR BLD AUTO: 83 %
NRBC # BLD AUTO: 0 10*3/UL
NRBC BLD AUTO-RTO: 0 /100
PLATELET # BLD AUTO: 463 10E9/L (ref 150–450)
POTASSIUM SERPL-SCNC: 4 MMOL/L (ref 3.4–5.3)
RBC # BLD AUTO: 3.11 10E12/L (ref 3.8–5.2)
SODIUM SERPL-SCNC: 138 MMOL/L (ref 133–144)
WBC # BLD AUTO: 13.3 10E9/L (ref 4–11)

## 2017-02-15 PROCEDURE — 25000125 ZZHC RX 250: Performed by: OBSTETRICS & GYNECOLOGY

## 2017-02-15 PROCEDURE — 25000132 ZZH RX MED GY IP 250 OP 250 PS 637: Performed by: OBSTETRICS & GYNECOLOGY

## 2017-02-15 PROCEDURE — 25000128 H RX IP 250 OP 636: Performed by: OBSTETRICS & GYNECOLOGY

## 2017-02-15 PROCEDURE — 85025 COMPLETE CBC W/AUTO DIFF WBC: CPT | Performed by: OBSTETRICS & GYNECOLOGY

## 2017-02-15 PROCEDURE — 36415 COLL VENOUS BLD VENIPUNCTURE: CPT | Performed by: OBSTETRICS & GYNECOLOGY

## 2017-02-15 PROCEDURE — 12000001 ZZH R&B MED SURG/OB UMMC

## 2017-02-15 PROCEDURE — 85610 PROTHROMBIN TIME: CPT | Performed by: OBSTETRICS & GYNECOLOGY

## 2017-02-15 PROCEDURE — 80048 BASIC METABOLIC PNL TOTAL CA: CPT | Performed by: OBSTETRICS & GYNECOLOGY

## 2017-02-15 RX ORDER — IBUPROFEN 600 MG/1
600 TABLET, FILM COATED ORAL EVERY 6 HOURS
Status: DISCONTINUED | OUTPATIENT
Start: 2017-02-15 | End: 2017-02-17 | Stop reason: HOSPADM

## 2017-02-15 RX ORDER — METOCLOPRAMIDE HYDROCHLORIDE 5 MG/ML
10 INJECTION INTRAMUSCULAR; INTRAVENOUS EVERY 6 HOURS PRN
Status: DISCONTINUED | OUTPATIENT
Start: 2017-02-15 | End: 2017-02-17 | Stop reason: HOSPADM

## 2017-02-15 RX ADMIN — PROCHLORPERAZINE EDISYLATE 5 MG: 5 INJECTION INTRAMUSCULAR; INTRAVENOUS at 04:47

## 2017-02-15 RX ADMIN — ONDANSETRON 4 MG: 4 TABLET, ORALLY DISINTEGRATING ORAL at 04:24

## 2017-02-15 RX ADMIN — PROCHLORPERAZINE EDISYLATE 5 MG: 5 INJECTION INTRAMUSCULAR; INTRAVENOUS at 08:14

## 2017-02-15 RX ADMIN — OXYCODONE HYDROCHLORIDE 10 MG: 5 TABLET ORAL at 09:26

## 2017-02-15 RX ADMIN — OXYCODONE HYDROCHLORIDE 10 MG: 5 TABLET ORAL at 00:15

## 2017-02-15 RX ADMIN — OXYCODONE HYDROCHLORIDE 10 MG: 5 TABLET ORAL at 14:37

## 2017-02-15 RX ADMIN — ACETAMINOPHEN 650 MG: 325 TABLET, FILM COATED ORAL at 17:27

## 2017-02-15 RX ADMIN — HYDROMORPHONE HYDROCHLORIDE 0.5 MG: 10 INJECTION, SOLUTION INTRAMUSCULAR; INTRAVENOUS; SUBCUTANEOUS at 01:16

## 2017-02-15 RX ADMIN — ENOXAPARIN SODIUM 40 MG: 40 INJECTION SUBCUTANEOUS at 09:17

## 2017-02-15 RX ADMIN — OXYCODONE HYDROCHLORIDE 10 MG: 5 TABLET ORAL at 06:25

## 2017-02-15 RX ADMIN — ACETAMINOPHEN 650 MG: 325 TABLET, FILM COATED ORAL at 23:19

## 2017-02-15 RX ADMIN — OXYCODONE HYDROCHLORIDE 10 MG: 5 TABLET ORAL at 03:20

## 2017-02-15 RX ADMIN — OMEPRAZOLE 20 MG: 20 CAPSULE, DELAYED RELEASE ORAL at 09:17

## 2017-02-15 RX ADMIN — MAGNESIUM HYDROXIDE 15 ML: 400 SUSPENSION ORAL at 09:16

## 2017-02-15 RX ADMIN — IBUPROFEN 600 MG: 600 TABLET ORAL at 09:16

## 2017-02-15 RX ADMIN — HYDROMORPHONE HYDROCHLORIDE 0.5 MG: 10 INJECTION, SOLUTION INTRAMUSCULAR; INTRAVENOUS; SUBCUTANEOUS at 04:24

## 2017-02-15 RX ADMIN — ONDANSETRON 4 MG: 4 TABLET, ORALLY DISINTEGRATING ORAL at 13:03

## 2017-02-15 RX ADMIN — NICOTINE 1 PATCH: 14 PATCH, EXTENDED RELEASE TRANSDERMAL at 09:17

## 2017-02-15 RX ADMIN — ACETAMINOPHEN 650 MG: 325 TABLET, FILM COATED ORAL at 11:19

## 2017-02-15 RX ADMIN — PROCHLORPERAZINE EDISYLATE 5 MG: 5 INJECTION INTRAMUSCULAR; INTRAVENOUS at 14:45

## 2017-02-15 RX ADMIN — SENNOSIDES AND DOCUSATE SODIUM 2 TABLET: 8.6; 5 TABLET ORAL at 20:38

## 2017-02-15 RX ADMIN — ACETAMINOPHEN 650 MG: 325 TABLET, FILM COATED ORAL at 03:19

## 2017-02-15 RX ADMIN — IBUPROFEN 600 MG: 600 TABLET ORAL at 22:02

## 2017-02-15 RX ADMIN — SENNOSIDES AND DOCUSATE SODIUM 1 TABLET: 8.6; 5 TABLET ORAL at 09:16

## 2017-02-15 ASSESSMENT — PAIN DESCRIPTION - DESCRIPTORS
DESCRIPTORS: SORE

## 2017-02-15 NOTE — PLAN OF CARE
Problem: Goal Outcome Summary  Goal: Goal Outcome Summary  Outcome: No Change  A&OX4, VSS on 1L O2 NC. Abdominal incision covered w/ primapore, dried drainage present. Pain fairly controlled with scheduled tylenol, PRN Oxy, and PRN Dilaudid. Pt dangled at bed and stood at bedside. After standing pt had sudden queasy feeling, PRN IV Compazine given. No emesis and nausea resolved. Good urine output overnight. Akins removed @ 0645, pt due to void. PIV in place w/ IVMF's infusing.      Plan: encourage ambulation, use of IS, Pain management.

## 2017-02-15 NOTE — PROGRESS NOTES
Gynecology Oncology Progress Note            Date: 2/15/2017   POD# 1  Procedure: exploratory Laparotomy, Total Abdominal Hysterectomy, Bilateral Salpingo-Oophorectomy, Tumor Debulking with Cusa, Omentectomy, Intraperitoneal Port Placement, cystoscopy, proctoscopy, bilateral pelvic and paraaortic lymph node dissection  Disease: Likely IIIC ovarian cancer    24 hour events:   - S/p above surgery  - Transitioned to oral pain medications.    Subjective: Patient is feeling fine but tired. Pain is present but tolerable. Had some nausea with movement but no emesis. She denies any fever, chills, chest pain, shortness of breath. No flatus and no BM.  Akins still in. Dangled once without dizziness.      Objective:   Vitals:    02/14/17 2130 02/14/17 2145 02/14/17 2215 02/15/17 0016   BP: 136/53 136/61 117/64 133/52   BP Location:    Right arm   Resp:    18   Temp:    96.4  F (35.8  C)   TempSrc:    Axillary   SpO2: 99% 99% 99% 100%   Weight:       Height:         EXAM:   General: appears comfortable, in NAD  CV: RRR, no murmurs noted  Resp: CTAB, no increased work of breathing on 2L oxygen  Abdomen: soft, appropriately tender, non-distended  Incision: clean, dry, intact, no erythema or drainage, bandage overlying midline incision and IP port with minimal serosanguinous drainage   Extremities: nontender, no BLE edema, SCDs in place    24h fluid balance:    Gross per 24 hour   Intake          3616.67 ml   Output             2800 ml   Net           816.67 ml     UOP:  575 mL since midnight    Lines: Akins, PIV, IP port    Labs:  ROUTINE IP LABS (Last four results)  BMP  Recent Labs  Lab 02/15/17  0505 02/14/17  1626 02/14/17  1429 02/14/17  1208    137 138  --    POTASSIUM 4.0 3.5 3.6  --    CHLORIDE 102  --   --   --    THIEN 8.0*  --   --   --    CO2 27  --   --   --    BUN 6*  --   --   --    CR 0.47*  --   --  0.51*   * 174* 136*  --      CBC  Recent Labs  Lab 02/15/17  0505 02/14/17  2120 02/14/17  2590  02/14/17  1429   WBC 13.3* 15.3*  --   --    RBC 3.11* 3.18*  --   --    HGB 8.8* 9.0* 9.8* 11.0*   HCT 28.4* 28.9*  --   --    MCV 91 91  --   --    MCH 28.3 28.3  --   --    MCHC 31.0* 31.1*  --   --    RDW 14.4 14.5  --   --    * 404  --   --      INR  Recent Labs  Lab 02/15/17  0505 02/14/17  2120   INR 1.17* 1.29*       Assessment: Lucero Dsouza is a 61 year old female POD#1 s/p exploratory Laparotomy, Total Abdominal Hysterectomy, Bilateral Salpingo-Oophorectomy, Tumor Debulking with Cusa, Omentectomy, Intraperitoneal Port Placement, cystoscopy, proctoscopy, bilateral pelvic and paraaortic lymph node dissection.     Active Problem list:  -Likely IIIC ovarian cancer with 1.8 L ascites evacuated  -Tobacco abuse  -Acute blood loss anemia    Plan:    Dz: Likely IIIC ovarian cancer, final pathology pending.  FEN: LR at 100 cc/hr. Will discontinue when tolerating PO  Pain: Transitioning to oral oxycodone and tylenol  Heme: Hgb 11.1> (9.8 intraop)> 9> 8.8  CV: Hyperlipidemia - no meds  Pulm: Encouraged IS.  GI: GERD - home H2 blocker ordered.  : Akins in place, to be removed this morning  ID: No issues  Endocrine: No issues  Psych/Neuro: Current smoker of 20 cigarettes a day, nicotine patch ordered. History of alcohol and methamphetamine abuse.  PPX: SCDs, H2B, plan to start Lovenox in AM. Will need discharge with this.  Dispo: Discharge when meeting discharge goals including tolerating regular diet without nausea/emesis, pain well controlled on PO medications, voiding/ambulating without issue, passing flatus, vitals within normal limits. Anticipate discharge POD#3.    Karin Langston MD   OBGYN, PGY-3  2/15/2017 6:32 AM      I have examined this patient with the team and agree with the above findings and plan.  Patient making appropriate POD 1 progress.  Will ADAT and ambulate - otherwise as above.    Juan Daniel

## 2017-02-15 NOTE — DISCHARGE SUMMARY
Gynecologic Oncology Discharge Summary    Lucero Dsouza  [id number]    Admit Date: 2/14/2017  Discharge Date: 2/17/2017   Admitting Provider: Dr. Daniel  Discharge Provider: Dr. 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    Prior to Admission Medications:  Prescriptions Prior to Admission   Medication Sig Dispense Refill Last Dose     fluticasone-salmeterol (ADVAIR DISKUS) 100-50 MCG/DOSE diskus inhaler Inhale 1 puff into the lungs   2/14/2017 at 0900     omeprazole (PRILOSEC) 20 MG CR capsule Take 20 mg by mouth        albuterol (PROAIR HFA/PROVENTIL HFA/VENTOLIN HFA) 108 (90 BASE) MCG/ACT Inhaler Inhale 2 puffs into the lungs daily as needed    Past Week at Unknown time     nicotine (NICOTROL) 10 MG Inhaler Inhale 1-2 puffs into the lungs daily as needed    Past Week at Unknown time     omeprazole (PRILOSEC) 20 MG CR capsule Take 20 mg by mouth daily    2/14/2017 at 0800     Multiple Vitamins-Minerals (MULTIVITAMIN ADULT PO) Take 1 tablet by mouth daily    Past Week at Unknown time     Bismuth Subsalicylate (PEPTO-BISMOL PO) Take by mouth daily   Past Month at Unknown time     Ascorbic Acid (VITAMIN C PO) Take 500 mg by mouth daily   Past Week at Unknown time     glucosamine-chondroitin 500-400 MG CAPS per capsule Take 1 capsule by mouth  daily 1500 mg daily   Past Week at Unknown time       Discharge Medications:     Review of your medicines      START taking       Dose / Directions    acetaminophen 325 MG tablet   Commonly known as:  TYLENOL   Used for:  S/P abdominal hysterectomy        Dose:  650 mg   Take 2 tablets (650 mg) by mouth every 6 hours   Quantity:  50 tablet   Refills:  0       enoxaparin 40 MG/0.4ML injection   Commonly known as:  LOVENOX   Used for:  S/P abdominal hysterectomy        Dose:  40 mg   Inject 0.4 mLs (40 mg) Subcutaneous every 24 hours   Quantity:  25 Syringe   Refills:  0       ibuprofen 600 MG tablet   Commonly known as:  ADVIL/MOTRIN   Used for:  S/P abdominal hysterectomy        Dose:  600 mg   Take 1 tablet (600 mg) by mouth every 6 hours for 50 doses   Quantity:  50 tablet   Refills:  0       oxyCODONE 5 MG IR tablet   Commonly known as:  ROXICODONE   Used for:  S/P abdominal hysterectomy        Dose:  5-10 mg   Take 1-2 tablets (5-10 mg) by mouth every 3 hours as needed for moderate to severe pain   Quantity:  20 tablet   Refills:  0       senna-docusate 8.6-50 MG per tablet   Commonly known as:  SENOKOT-S;PERICOLACE   Used for:  S/P abdominal hysterectomy        Dose:  1-2 tablet   Take 1-2 tablets by mouth 2 times daily for 50 doses   Quantity:  50 tablet   Refills:  0         CONTINUE these medicines which have NOT CHANGED       Dose / Directions    ADVAIR DISKUS 100-50 MCG/DOSE diskus inhaler   Generic drug:  fluticasone-salmeterol        Dose:  1 puff   Inhale 1 puff into the lungs   Refills:  0       albuterol 108 (90 BASE) MCG/ACT Inhaler   Commonly known as:  PROAIR HFA/PROVENTIL HFA/VENTOLIN HFA        Dose:  2 puff   Inhale 2 puffs into the lungs daily as needed   Refills:  0       glucosamine-chondroitin 500-400 MG Caps per capsule        Dose:  1 capsule   Take 1 capsule by mouth daily 1500 mg daily   Refills:  0       MULTIVITAMIN ADULT PO        Dose:  1 tablet   Take 1 tablet by mouth daily    Refills:  0       NICOTROL 10 MG Inhaler   Generic drug:  nicotine        Dose:  1-2 puff   Inhale 1-2 puffs into the lungs daily as needed   Refills:  0       PEPTO-BISMOL PO        Take by mouth daily   Refills:  0       * priLOSEC 20 MG CR capsule   Generic drug:  omeprazole        Dose:  20 mg   Take 20 mg by mouth daily   Refills:  0       * omeprazole 20 MG CR capsule   Commonly known as:  priLOSEC        Dose:  20 mg   Take 20 mg by mouth   Refills:  0       VITAMIN C PO        Dose:  500 mg   Take 500 mg by mouth daily   Refills:  0       * Notice:  This list has 2 medication(s) that are the same as other medications prescribed for you. Read the directions carefully, and ask your doctor or other care provider to review them with you.         Where to get your medicines      These medications were sent to [facility] - [place], [place] - [address]  [address] [address]     Phone:  [phone]      acetaminophen 325 MG tablet     enoxaparin 40 MG/0.4ML injection     ibuprofen 600 MG tablet     senna-docusate 8.6-50 MG per tablet         Some of these will need a paper prescription and others can be bought over the counter. Ask your nurse if you have questions.     Bring a paper prescription for each of these medications      oxyCODONE 5 MG IR tablet             Consultations:  None    Brief History of Illness:  Lucero Dsouza is a 60yo female who presented to primary care physician with 25 lb weight loss and palpable abdominal mass. On imaging, she had an 18cm R pelvic mass most likely arising from the ovary with a CA-125 of 5232. Patient with family history of colon cancer with her mother dying from the disease at the age of 50yo. Given the above, she was consented for the above procedure and taken to the OR.     Hospital Course:  Dz:   - Preoperative diagnosis was R pelvic mass.  Frozen section at the time of the surgery showed high grade carcinoma favoring  serous adenocarcinoma.  Final p    athology is pending at the time of discharge.  She will follow-up postoperatively for a care plan.  FEN:   - She was maintained on IVF until POD#1.  On POD#1 she had 2 episodes of vomiting and was kept on a clear liquid diet until POD#2 when she was able to tolerated a regular diet. By discharge, she was tolerating a regular diet without nausea and vomiting and able to maintain her hydration without IVF supplementation.  Pain:   - Her pain was initially controlled on a dilaudid bumps.  Once tolerating PO pain meds, she was transitioned to a PO pain regimen.  Her pain was well controlled on this and she was discharged home with these medications.  CV:   - She has no history of CV issues except for HLD not on medications.   Her vital signs were stable while in house and she had no acute CV issues.  PULM:   - She has history of COPD and asthma.  She was initially given O2 supplementation in to maintain her O2 sats in the immediate postop period and was transitioned off of this without difficulty.  By discharge, her O2 sats were greater than 94% on RA.  She was encouraged to use her bedside IS while in house.  She had no acute pulmonary issues while in house.  HEME:   - Her preoperative Hgb was 12.7.  Her hgb dropped to 7.9 postoperatively. She had no other acute heme issues while in house.  GI:   - On POD#0, her diet was advanced slowly as tolerated. On POD#1 patient developed N/V and diet decreased to liquids. On POD#2 diet was advanced to regular without N/V. At the time of discharge, she was tolerating a regular diet without nausea and vomiting.     - On POD#2-3, she noted diarrhea and C. Diff was tested and noted to be negative. Her bowel regimen was held at that time. She will be discharged with a PRN bowel regimen to prevent constipation in the postoperative period.  She had no acute GI issues while in house.  :    - A salazar catheter was placed at the time of the surgery.   Once ambulating unassisted, the salazar catheter was removed.  Prior to discharge, the patient was voiding spontaneously without difficulty. She had no acute  issues while in house.  ID:   - The patient was AF during her hospitalization.  She received standard preoperative antibiotics without incident.    ENDO:   - No acute issues  PSYCH/NEURO:   - Nicotine patch placed.  PPX:    - She was given SCDs, IS, PPI and lovenox during her hospital course.  She tolerated these prophylactic interventions without incident.  They were discontinued at the time of her discharge. She will complete a 28 day course of lovenox 40 mg sc daily.      Discharge Instructions and Follow up:  Ms. Lucero Dsouza was discharged from the hospital with follow up for post op visit    Discharge Diet: Regular  Discharge Activity: Activity as tolerated  Discharge Follow up: 3/6/17 Dr Daniel    Discharge Disposition:  Discharged to home    Discharge Staff: Dr. 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    Addendum added for Juan Daniel MD on 3/9/2017 by Eliza Sparks.

## 2017-02-15 NOTE — PROGRESS NOTES
"Gynecologic OncologyProgress Note  2/16/17    S: 2 episodes of emesis this afternoon. States that emesis \"comes out of nowhere\" with sudden movement. She also has had intermittent lightheadedness with movement but does not report continued nausea throughout the day. Ambulating frequently throughout the hallway. Significant pain present but well controlled with PRN oxycodone use. No flatus or BM, denies feeling \"full or constipated\".     O:  Vitals:    02/14/17 2215 02/15/17 0016 02/15/17 0200 02/15/17 0722   BP: 117/64 133/52  122/65   BP Location:  Right arm  Left arm   Resp:  18  18   Temp:  96.4  F (35.8  C)  95.7  F (35.4  C)   TempSrc:  Axillary  Oral   SpO2: 99% 100% 99% 97%   Weight:       Height:           Gen: NAD, lying in bed  Cardio: RRR, S1/S2 clear with no murmurs, rubs or gallops  Resp: CTAB, no wheezing or cough  Abdomen: Bowel sounds hypoactive, mild non-focal tenderness in all four quadrants, appropriate tenderness reilly-incisionally. Mild distention unchanged from this morning with no rebound tenderness or guarding.    Incision: C/D/I with no erythema  Extremities: WWP, non tender, non-edematous, SCDs present.       Intake/Output Summary (Last 24 hours) at 02/15/17 1408  Last data filed at 02/15/17 0900   Gross per 24 hour   Intake          4856.67 ml   Output             3550 ml   Net          1306.67 ml     A/P: 61 year old with history of tobacco abuse, family hx colon cancer who is POD #1 s/p exploratory Laparotomy, Total Abdominal Hysterectomy, Bilateral Salpingo-Oophorectomy, Tumor Debulking with Cusa, Omentectomy, Intraperitoneal Port Placement, cystoscopy, proctoscopy, bilateral pelvic and paraaortic lymph node dissection. Post-operative emesis likely due to frequent ambulation and oxycodone use with poor PO intake. No worsening distention suggesting Ileus, however if continues to have emesis with no flatus or BM, will need AXR.     -Compazine, Reglan, Zofran PRN   -Clear liquid diet  -If " is unable to tolerate next meal of clears, will need AXR, NPO and mIVF.   -Monitor for worsening distention and abdominal pain       Rosalba Foley, MS3  2/15/2017 2:03 PM      Addendum:  Evaluated patient with MSEma Foley above. Appreciate note above. Patient with some nausea currently after emesis that came on after sudden movement. She has ambulated 4 times today. She denies any constipation or feeling of being bloating. Pain is controlled with the medications. No flatus but had some earlier. Vital signs stable without tachycardia or hypotension. Her abdominal exam has hypoactive bowel sounds with mild distention with appropriate tenderness. UOP adequate. Will give compazine now as she is due for it and will add reglan to her regimen. Differential includes PONV due to anesthesia, pain medications causing nausea, over-exertion with movement, ileus, small bowel obstruction. Patient has appetite and so will see if she can tolerate broth. If so, can then advance diet. If not, will obtain AXR and then make NPO with mIVF if needed. Will monitor for worsening distention or abdominal pain.    Karin Langston MD   OBGYN, PGY-3  2/15/2017 3:23 PM      Karin Langston MD   OBGYN, PGY-3  2/15/2017 3:18 PM

## 2017-02-15 NOTE — PROGRESS NOTES
GYN ONC Postoperative Check Progress Note    S: Patient reports she is doing okay. Pain is primarily incisional, she would like something for pain now and would like to try orals despite mild nausea. Denies chest pain, shortness of breath, dizziness, or other concerns at this time.     O:  Vitals:    02/14/17 1945 02/14/17 2000 02/14/17 2015 02/14/17 2049   BP: 141/87 137/74 138/87 136/59   BP Location:    Right arm   Resp: 18 18 18 18   Temp: 97.6  F (36.4  C)  97.5  F (36.4  C) 96.4  F (35.8  C)   TempSrc: Oral  Oral Oral   SpO2: 100% 97% 100% 99%   Weight:       Height:       Gen: Alert, NAD  Cardio: RRR, no m/g/r  Resp: CTAB  Abdomen: soft, appropriately tender, Bandage with small amount of shadowing.  Extremities: Non-tender, no LE edema, SCDs in place    A: 61 year old POD#0 s/p exploratory Laparotomy, Total Abdominal Hysterectomy, Bilateral Salpingo-Oophorectomy, Tumor Debulking with Cusa, Omentectomy, Intraperitoneal Port Placement, cystoscopy, proctoscopy, bilateral pelvic and paraaortic lymph node dissection. Doing okay.    P:  Dz: Likely IIIC ovarian cancer, final pathology pending.  FEN: LR at 100 cc/hr  Pain: Transitioning to oral oxycodone now.  Heme: Hgb 11.1> (9.8 intraop)> 9 at 2120.  CV: Hyperlipidemia - no meds  Pulm: Encouraged IS.  GI: GERD - home H2 blocker ordered.  : Akins in place, to be removed POD#1  ID: No issues  Endocrine: No issues  Psych/Neuro: History of alcohol and methamphetamine abuse years ago. Current smoker of 20 cigarettes a day, nicotine patch ordered.  PPX: SCDs, H2B, plan to start Lovenox in AM. Will need discharge with this.  Dispo:  When meeting goals.    Valentina Vale MD  OB/GYN Resident-PGY4

## 2017-02-15 NOTE — ANESTHESIA POSTPROCEDURE EVALUATION
Patient: Lucero Dsouza    Procedure(s):  Exploratory Laparotomy, Total Abdominal Hysterectomy, Bilateral Salpingo-Oophorectomy, Tumor Debulking with Cusa, Omentectomy, Intraperitoneal Port Placement, cystoscopy, proctoscopy, bilateral pelvic and paraaortic lymph node disection - Wound Class: II-Clean Contaminated   - Wound Class: II-Clean Contaminated   - Wound Class: II-Clean Contaminated    Diagnosis:Malignant Neoplasm of Ovary   Diagnosis Additional Information: No value filed.    Anesthesia Type:  General, ETT    Note:  Anesthesia Post Evaluation    Patient location during evaluation: Bedside and PACU  Patient participation: Able to fully participate in evaluation  Level of consciousness: awake  Pain management: adequate  Airway patency: patent  Cardiovascular status: acceptable  Respiratory status: acceptable  Hydration status: acceptable  PONV: none     Anesthetic complications: None          Last vitals:  Vitals:    02/14/17 1945 02/14/17 2000 02/14/17 2015   BP: 141/87 137/74 138/87   Resp: 18 18 18   Temp: 36.4  C (97.6  F)  36.4  C (97.5  F)   SpO2: 100% 97% 100%         Electronically Signed By: Esperanza Hopkins MD  February 14, 2017  8:22 PM

## 2017-02-15 NOTE — OP NOTE
DATE OF SURGERY:  02/14/2017      PREOPERATIVE DIAGNOSIS:  Pelvic mass suspicious for cancer.      POSTOPERATIVE DIAGNOSIS:  High-grade serous ovarian carcinoma consistent with stage IIIC with broad metastasis.      ATTENDING:  Juan Daniel MD      ANESTHESIA:  GET.      ASSISTING:  Juan Antonio Monterroso and Kian      PROCEDURE:  Exploratory laparotomy, technically challenging the hysterectomy, BSO, omentectomy, pelvic and periaortic lymph node dissection, CUSA debulking, placement of intraperitoneal port, cystoscopy.      ESTIMATED BLOOD LOSS:  Approximately 300 mL.      INTRAVENOUS FLUIDS:  1500 albumin, 1700 of crystalloid, Akins is 165 mL of urine, and approximately 1800 mL of ascites.      DESCRIPTION OF PROCEDURE:  After obtaining informed consent, the patient was brought to the operating room.  She was placed in supine position.  She underwent the induction of general anesthesia, was prepped and draped in the usual sterile fashion.  A Akins was placed sterilely.  The patient was examined and as noted to have a large mobile mass in the center of the abdomen.  The midline incision was made, extended down to the peritoneum.  The peritoneum was entered.  There was a small amount of ascites which was collected for cytology and for the neoadjuvant study and NK study.      The abdomen was then explored and unfortunately there was a large central mass which precluded a thorough evaluation of the abdomen.  For this reason, the incision was extended above the umbilicus, the mass was delivered into the field and noted to be the right ovary.  At this point, we could not visualize any areas that were inconsistent with complete resection to optimal disease, though there was widespread phlegmonous-appearing areas around the small and large bowel.      The sigmoid colon was densely adherent to the posterior aspect of the uterus and the left ovary was infested with tumor and folded into the ovarian fossa.  The decision was  made to proceed.  We first ligated the right round ligaments, identified retroperitoneal structures including the ureter, isolated the IP and transected this.  The broad ligament was then incised and the right ovary was amputated and the cornua tied.  The ovary was sent for frozen, which revealed a serous carcinoma.  At this point, the hysterectomy was completed in the usual fashion after dissecting the rectum away from the uterus and the anterior cul-de-sac away from the underlying bladder.  This left a significant flap anteriorly to the uterus.      The bladder was reflected inferiorly.  The uterine arteries were transected and skeletonized down to the level of the cervix, and the uterus was amputated.  The cervix was closed in a series of Vicryl 2-0 sutures in an interrupted figure-of-eight fashion.  At this point, the remainder of the abdomen was explored.  There were again noted to be no areas of unresectable disease, although there was miliary disease involving the peritoneum, small and large bowel.  Omentum had disease measuring up to 2 cm and the gallbladder had small miliary spots as well.  Otherwise, the gallbladder appeared grossly normal.  At this point, we performed the omentectomy in the usual fashion by reflecting it superiorly, dissecting off the transverse colon and elevating the mass into the surgical field.  The lesser pouch was explored and there was noted to be no masses and the omentum was resected at the level of the gastroepiploic vessels.      At this point, the bulk of the large tumors were removed.  However, palpation of the lymph nodes on the previous CT scan demonstrated enlarged nodes in the periaortic area and there were no palpably abnormal nodes in the pelvis.  So the retroperitoneum was again entered, ureters were identified, and the nodes were removed from the mid portion of the psoas muscle to the obturator space and we excised all abnormal nodes.  There were abnormal nodes in the  right and left side.  Unfortunately, at the level of the aorta bifurcation largely overlying the vena cava, the largest confluence of nodes measuring approximately 6.5 x 4 x 3 cm in continuity.  These were sharply dissected over a period of approximately 1 hour.  Multiple clips were placed to assure hemostasis and the single  was injured during the course of this, but immediately closed using a clip.  At the conclusion, the mass was excised, ureter was noted to be intact, cystoscopy was performed and both jets were patent.  At this point, we CUSA'd the remainder of the tumor to the degree that no tumor larger than 5 mm was left in place.  There were hundreds or perhaps thousands of these smaller ones.  Owing to the dissection of the sigmoid colon, an insufflation test was performed by inserting a proctoscope into the rectum and insufflating under fluid until gas returned around the proctoscope.  This failed to demonstrate any defects.      At this point, the abdomen was irrigated.  There was good hemostasis.  The vena cava was covered in Surgicel and the peritoneal port was placed at the costal margin in the usual fashion.  The fascia was closed in a running fashion with PDS suture, and the skin was closed using staples.  The patient was brought to the recovery room in stable condition.         JOSE SERVIN MD             D: 2017 18:42   T: 2017 20:07   MT: CHASTITY      Name:     HAYLEY BACA   MRN:      [id number]        Account:        [id number]   :      1955           Procedure Date: 2017      Document: [id number]

## 2017-02-15 NOTE — PLAN OF CARE
Problem: Goal Outcome Summary  Goal: Goal Outcome Summary  Outcome: No Change  Pt arrived to 7c via stretcher around 2030. Orientated to room. A & O x4. VSS. 2L of O2. Pain managed with oxycodone, tylenol, IV dilaudid. Gave 4mg of zofran. Port was placed tonight. Location: under right breast. Not accessed. PIV running MIVF. Abd inc covered with a primapore with dried drainage. PCDs on. Akins in place.      PLAN: guilherme IS teaching

## 2017-02-15 NOTE — ANESTHESIA CARE TRANSFER NOTE
Patient: Lucero Dsouza    Procedure(s):  Exploratory Laparotomy, Total Abdominal Hysterectomy, Bilateral Salpingo-Oophorectomy, Tumor Debulking with Cusa, Omentectomy, Intraperitoneal Port Placement, cystoscopy, proctoscopy, bilateral pelvic and paraaortic lymph node disection - Wound Class: II-Clean Contaminated   - Wound Class: II-Clean Contaminated   - Wound Class: II-Clean Contaminated    Diagnosis: Malignant Neoplasm of Ovary   Diagnosis Additional Information: No value filed.    Anesthesia Type:   General, ETT     Note:  Airway :Face Mask  Patient transferred to:PACU  Comments: VSS. Breathing spont. Denies pain. Report to RN at bedside.      Vitals: (Last set prior to Anesthesia Care Transfer)    CRNA VITALS  2/14/2017 1843 - 2/14/2017 1917      2/14/2017             Resp Rate (set): 10                Electronically Signed By: CAROLINE Bullock CRNA  February 14, 2017  7:17 PM

## 2017-02-15 NOTE — PLAN OF CARE
Problem: Goal Outcome Summary  Goal: Goal Outcome Summary  Outcome: Improving  VSS. Pain controlled w/ PO oxycodone x1 and scheduled tyelonol and ibproufen. Had 50cc emesis beginning of shift, given IV compazine 5mg w/ relief in symptoms. Otherwise denies nausea. Midline covered w/ primapore, small serosang drainage noted. R IP port dressing CDI. Denies gas or BM. Voiding spont in adequate amounts. R & L PIVs saline locked. Up w/ SBA. On clear liquids, tolerating well. Continue w/ POC.    UPDATE: Pt had unvisualized emesis into toilet. MDs notified and came and assessed Pt. Will continue to monitor.

## 2017-02-15 NOTE — BRIEF OP NOTE
[facility], [place]    Brief Operative Note    Pre-operative diagnosis: R Pelvic Mass    Post-operative diagnosis High grade carcinoma favoring serous adenocarcinoma  Procedure: Procedure(s):  Exploratory Laparotomy, Total Abdominal Hysterectomy, Bilateral Salpingo-Oophorectomy, Tumor Debulking with Cusa, Omentectomy, Intraperitoneal Port Placement, cystoscopy, proctoscopy, bilateral pelvic and paraaortic lymph node disection - Wound Class: II-Clean Contaminated   - Wound Class: II-Clean Contaminated   - Wound Class: II-Clean Contaminated  Surgeon: Surgeon(s) and Role:     * Juan Daniel MD - Primary     * Lilly Monterroso MD - Assisting     * Karin Langston MD- Resident Assisting  Anesthesia: Combined General with Block   Estimated blood loss: 300 ml EBL and 1800 mL ascites  Drains:  Intra-peritoneal port    Specimens:   ID Type Source Tests Collected by Time Destination   A : pelvic washings Washings Abdomen CYTOLOGY NON GYN Jaun Daniel MD 2/14/2017  2:15 PM    B : Right tube and ovary Tissue Fallopian Tube and Ovary, Right SURGICAL PATHOLOGY EXAM Juan Daniel MD 2/14/2017  2:33 PM    C : Uterus, cervix, left fallopian tube and ovary  Tissue Uterus and Cervix SURGICAL PATHOLOGY EXAM Juan Daniel MD 2/14/2017  3:26 PM    D : Omentum  Tissue Omentum SURGICAL PATHOLOGY EXAM Juan Daniel MD 2/14/2017  3:59 PM    E : Left Pelvic Peritoneum  Tissue Abdomen SURGICAL PATHOLOGY EXAM Juan Daniel MD 2/14/2017  4:39 PM    F : Right Pelvic Lymph Nodes  Tissue Lymph Node, Right Pelvic SURGICAL PATHOLOGY EXAM Juan Daniel MD 2/14/2017  5:12 PM    G : Left Pelvic Lymph Nodes  Tissue Lymph Node, Left Pelvic SURGICAL PATHOLOGY EXAM Juan Daniel MD 2/14/2017  5:17 PM    H : Vena cava lymph node Tissue Lymph Node SURGICAL PATHOLOGY EXAM Juan Daniel MD 2/14/2017  6:23 PM       Findings:   On speculum exam, normal appearing cervix. Abdominal mass palpable above umbilicus. On entry into abdomen, moderate amount (~1800 mL) of hemorrhagic ascites drained. Large ~20cm multi-loculated vascular mass arising from right ovary and adhered to bowel. Mass was able to be removed and preliminary pathology showed high grade carcinoma, favoring serous adenocarcinoma so proceeded with debulking surgery. Uterus was adherent to bladder and rectum but otherwise normal appearing. Left ovary appeared abnormal with tumor present and had a likely endometrioma that ruptured chocolate fluid prior to removal. Innumerable studding of bowel serosa with tumor present. CUSA used to remove larger areas of studding on the bowel. Diffuse diverticular disease noted. Left peritoneum stripped of tumors to get less than 0.5cm of disease present. Enlarged bilateral pelvic lymph nodes palpated and removed. Enlarged 7cm para-aortic lymph node on top of th inferior vena cava removed with care. The liver appeared to have adhesions as well as tumor present. Cystoscopy showed brisk bilateral ureteral jets. Proctoscopy revealed an air tight colon without evidence of damage to the integrity of the bowel. Hemostasis was ensured. Intra-peritoneal port placed without difficulty.    Complications: None but surgery technically challenging given large size of pelvic mass and enlarged para-aortic lymph nodes and innumerable studding of bowel serosa with tumor  Implants: None.

## 2017-02-16 LAB
ANION GAP SERPL CALCULATED.3IONS-SCNC: 5 MMOL/L (ref 3–14)
BUN SERPL-MCNC: 6 MG/DL (ref 7–30)
CALCIUM SERPL-MCNC: 8 MG/DL (ref 8.5–10.1)
CHLORIDE SERPL-SCNC: 106 MMOL/L (ref 94–109)
CO2 SERPL-SCNC: 32 MMOL/L (ref 20–32)
COPATH REPORT: NORMAL
CREAT SERPL-MCNC: 0.6 MG/DL (ref 0.52–1.04)
ERYTHROCYTE [DISTWIDTH] IN BLOOD BY AUTOMATED COUNT: 14.7 % (ref 10–15)
GFR SERPL CREATININE-BSD FRML MDRD: ABNORMAL ML/MIN/1.7M2
GLUCOSE SERPL-MCNC: 95 MG/DL (ref 70–99)
HCT VFR BLD AUTO: 26.2 % (ref 35–47)
HGB BLD-MCNC: 7.9 G/DL (ref 11.7–15.7)
MCH RBC QN AUTO: 28.1 PG (ref 26.5–33)
MCHC RBC AUTO-ENTMCNC: 30.2 G/DL (ref 31.5–36.5)
MCV RBC AUTO: 93 FL (ref 78–100)
PLATELET # BLD AUTO: 425 10E9/L (ref 150–450)
POTASSIUM SERPL-SCNC: 3.9 MMOL/L (ref 3.4–5.3)
RBC # BLD AUTO: 2.81 10E12/L (ref 3.8–5.2)
SODIUM SERPL-SCNC: 143 MMOL/L (ref 133–144)
WBC # BLD AUTO: 11.1 10E9/L (ref 4–11)

## 2017-02-16 PROCEDURE — 12000001 ZZH R&B MED SURG/OB UMMC

## 2017-02-16 PROCEDURE — 36415 COLL VENOUS BLD VENIPUNCTURE: CPT | Performed by: STUDENT IN AN ORGANIZED HEALTH CARE EDUCATION/TRAINING PROGRAM

## 2017-02-16 PROCEDURE — 25000132 ZZH RX MED GY IP 250 OP 250 PS 637: Performed by: OBSTETRICS & GYNECOLOGY

## 2017-02-16 PROCEDURE — 80048 BASIC METABOLIC PNL TOTAL CA: CPT | Performed by: STUDENT IN AN ORGANIZED HEALTH CARE EDUCATION/TRAINING PROGRAM

## 2017-02-16 PROCEDURE — 85027 COMPLETE CBC AUTOMATED: CPT | Performed by: STUDENT IN AN ORGANIZED HEALTH CARE EDUCATION/TRAINING PROGRAM

## 2017-02-16 PROCEDURE — 25000128 H RX IP 250 OP 636: Performed by: OBSTETRICS & GYNECOLOGY

## 2017-02-16 RX ADMIN — IBUPROFEN 600 MG: 600 TABLET ORAL at 10:46

## 2017-02-16 RX ADMIN — OMEPRAZOLE 20 MG: 20 CAPSULE, DELAYED RELEASE ORAL at 08:01

## 2017-02-16 RX ADMIN — SENNOSIDES AND DOCUSATE SODIUM 2 TABLET: 8.6; 5 TABLET ORAL at 20:06

## 2017-02-16 RX ADMIN — BISACODYL 10 MG: 10 SUPPOSITORY RECTAL at 08:02

## 2017-02-16 RX ADMIN — NICOTINE 1 PATCH: 14 PATCH, EXTENDED RELEASE TRANSDERMAL at 08:01

## 2017-02-16 RX ADMIN — ACETAMINOPHEN 650 MG: 325 TABLET, FILM COATED ORAL at 12:22

## 2017-02-16 RX ADMIN — ACETAMINOPHEN 650 MG: 325 TABLET, FILM COATED ORAL at 18:31

## 2017-02-16 RX ADMIN — ENOXAPARIN SODIUM 40 MG: 40 INJECTION SUBCUTANEOUS at 08:02

## 2017-02-16 RX ADMIN — ACETAMINOPHEN 650 MG: 325 TABLET, FILM COATED ORAL at 06:09

## 2017-02-16 RX ADMIN — IBUPROFEN 600 MG: 600 TABLET ORAL at 15:50

## 2017-02-16 RX ADMIN — IBUPROFEN 600 MG: 600 TABLET ORAL at 04:30

## 2017-02-16 RX ADMIN — IBUPROFEN 600 MG: 600 TABLET ORAL at 21:54

## 2017-02-16 RX ADMIN — SENNOSIDES AND DOCUSATE SODIUM 2 TABLET: 8.6; 5 TABLET ORAL at 08:01

## 2017-02-16 ASSESSMENT — PAIN DESCRIPTION - DESCRIPTORS: DESCRIPTORS: ACHING;CRAMPING

## 2017-02-16 NOTE — PLAN OF CARE
Problem: Goal Outcome Summary  Goal: Goal Outcome Summary  Outcome: Declining  VSS. Patient rested much of evening. Ambulated halls c SBA x 1. Tolerating clear liquids. Denies nausea. Voiding adequate amounts. Pain controled c Tylenol and Ibuprofen. Abdominal binder on. Abdominal dressing c dried drainage.  Using IS. Continue c POC.

## 2017-02-16 NOTE — PLAN OF CARE
Problem: Goal Outcome Summary  Goal: Goal Outcome Summary  Outcome: Improving  VSS. Pain controlled w/ PO tyelonol and ibproufen. Denies nausea. Midline and IP port site stapled, CDI. Passing gas, no BM. Voiding spont in adequate amounts. Up ad suleman. Tolerating regular diet. Probable DC tomorrow.     UPDATE: Pt passed two BMs, 1 small/hard, 1 soft/formed

## 2017-02-16 NOTE — PROGRESS NOTES
Gynecology Oncology Progress Note            Date: 2/15/2017   POD# 2  Procedure: exploratory Laparotomy, Total Abdominal Hysterectomy, Bilateral Salpingo-Oophorectomy, Tumor Debulking with Cusa, Omentectomy, Intraperitoneal Port Placement, cystoscopy, proctoscopy, bilateral pelvic and paraaortic lymph node dissection  Disease: Likely IIIC ovarian cancer    24 hour events:   - Vomiting yesterday, went to clear liquid diet     Subjective: Patient very hungry, wants to try regular diet this morning. No longer nauseated. Tolerated crackers with meds last night. Ambulating. Passing flatus. Voiding spontaneously.     Objective:   Vitals:    02/15/17 0200 02/15/17 0722 02/15/17 1610 02/15/17 2000   BP:  122/65 98/53 124/57   BP Location:  Left arm Left arm Left leg   Resp:  18 18 18   Temp:  95.7  F (35.4  C) 96.3  F (35.7  C) 96.5  F (35.8  C)   TempSrc:  Oral Oral Oral   SpO2: 99% 97% 94% 92%   Weight:       Height:         EXAM:   General: appears comfortable, in NAD  CV: RRR, no murmurs noted  Resp: CTAB ,decreased in bases.  Abdomen: soft, appropriately tender, mildly distended, + BS.  Incision: clean, dry, intact, no erythema or drainage, bandage overlying midline incision and IP port with minimal serosanguinous drainage   Extremities: nontender, no BLE edema, SCDs in place    24h fluid balance:     Intake/Output Summary (Last 24 hours) at 02/16/17 0617  Last data filed at 02/16/17 0610   Gross per 24 hour   Intake             1600 ml   Output             2550 ml   Net             -950 ml     UOP:  750 mL since midnight    Lines: Akins, PIV, IP port    Labs:  Component      Latest Ref Rng & Units 2/16/2017   Sodium      133 - 144 mmol/L 143   Potassium      3.4 - 5.3 mmol/L 3.9   Chloride      94 - 109 mmol/L 106   Carbon Dioxide      20 - 32 mmol/L 32   Anion Gap      3 - 14 mmol/L 5   Glucose      70 - 99 mg/dL 95   Urea Nitrogen      7 - 30 mg/dL 6 (L)   Creatinine      0.52 - 1.04 mg/dL 0.60   GFR  Estimate      >60 mL/min/1.7m2 >90 . . .   GFR Estimate If Black      >60 mL/min/1.7m2 >90 . . .   Calcium      8.5 - 10.1 mg/dL 8.0 (L)   WBC      4.0 - 11.0 10e9/L 11.1 (H)   RBC Count      3.8 - 5.2 10e12/L 2.81 (L)   Hemoglobin      11.7 - 15.7 g/dL 7.9 (L)   Hematocrit      35.0 - 47.0 % 26.2 (L)   MCV      78 - 100 fl 93   MCH      26.5 - 33.0 pg 28.1   MCHC      31.5 - 36.5 g/dL 30.2 (L)   RDW      10.0 - 15.0 % 14.7   Platelet Count      150 - 450 10e9/L 425       Assessment: Lucero Dsouza is a 61 year old female POD#2 s/p exploratory Laparotomy, Total Abdominal Hysterectomy, Bilateral Salpingo-Oophorectomy, Tumor Debulking with CUSA, Omentectomy, Intraperitoneal Port Placement, cystoscopy, proctoscopy, bilateral pelvic and paraaortic lymph node dissection.     Active Problem list:  -Likely IIIC ovarian cancer with 1.8 L ascites evacuated  -Tobacco abuse  -Acute blood loss anemia    Plan:    Dz: Likely IIIC ovarian cancer, final pathology pending.  FEN: Clear liquid diet due to vomiting yesterday, can progress today.  Pain: Transitioning to oral oxycodone and tylenol  Heme: Hgb 11.1> (9.8 intraop)> 9> 8.8>7.9. Likely from fluid shifts/equilibration. No signs of acute abdomen.  CV: Hyperlipidemia - no meds  Pulm: Encouraged IS.  GI: GERD - home H2 blocker ordered.  : Voiding spontaneously.  ID: No issues  Endocrine: No issues  Psych/Neuro: Current smoker of 20 cigarettes a day, nicotine patch ordered. Remote history of alcohol and methamphetamine abuse.  PPX: SCDs, H2B, Lovenox in AM. Will need discharge with this.  Dispo: Discharge when meeting discharge goals including tolerating regular diet without nausea/emesis, pain well controlled on PO medications, voiding/ambulating without issue, passing flatus, vitals within normal limits. Anticipate discharge POD#3.    Kim Vale MD  Obstetrics and Gynecology PGY-4     Pt seen and examined with the team.  I agree with the above findings and  plan.    Juan Daniel

## 2017-02-16 NOTE — PLAN OF CARE
Problem: Goal Outcome Summary  Goal: Goal Outcome Summary  Outcome: Improving  VSS.  Voiding large amounts.  Abd inc with dressing intact.  Old dry drainage present.  IP port site also CDI with scant amount of dried drainage as well.  Hypo bowel sounds, passing flatus, no stool yet.  UAL..   No PIV present-old one infiltrated @ midnight.  Drinking well, no nausea this shift.  Pain being managed with tylenol and ibuprofen.  Has not needed oxycodone this shift.   Anticipated DC tomorrow.

## 2017-02-17 ENCOUNTER — CARE COORDINATION (OUTPATIENT)
Dept: CARDIOLOGY | Facility: CLINIC | Age: 62
End: 2017-02-17

## 2017-02-17 ENCOUNTER — CARE COORDINATION (OUTPATIENT)
Dept: ONCOLOGY | Facility: CLINIC | Age: 62
End: 2017-02-17

## 2017-02-17 VITALS
SYSTOLIC BLOOD PRESSURE: 122 MMHG | OXYGEN SATURATION: 97 % | WEIGHT: 130.29 LBS | HEIGHT: 66 IN | DIASTOLIC BLOOD PRESSURE: 58 MMHG | BODY MASS INDEX: 20.94 KG/M2 | RESPIRATION RATE: 16 BRPM | HEART RATE: 75 BPM | TEMPERATURE: 98.2 F

## 2017-02-17 LAB
C DIFF TOX B STL QL: NORMAL
COPATH REPORT: NORMAL
SPECIMEN SOURCE: NORMAL

## 2017-02-17 PROCEDURE — 25000132 ZZH RX MED GY IP 250 OP 250 PS 637: Performed by: OBSTETRICS & GYNECOLOGY

## 2017-02-17 PROCEDURE — 87493 C DIFF AMPLIFIED PROBE: CPT | Performed by: OBSTETRICS & GYNECOLOGY

## 2017-02-17 PROCEDURE — 25000128 H RX IP 250 OP 636: Performed by: OBSTETRICS & GYNECOLOGY

## 2017-02-17 RX ORDER — IBUPROFEN 600 MG/1
600 TABLET, FILM COATED ORAL EVERY 6 HOURS
Qty: 50 TABLET | Refills: 0 | Status: SHIPPED
Start: 2017-02-17 | End: 2017-03-02

## 2017-02-17 RX ORDER — OXYCODONE HYDROCHLORIDE 5 MG/1
5-10 TABLET ORAL
Qty: 20 TABLET | Refills: 0 | Status: SHIPPED | OUTPATIENT
Start: 2017-02-17

## 2017-02-17 RX ORDER — AMOXICILLIN 250 MG
1-2 CAPSULE ORAL 2 TIMES DAILY
Qty: 50 TABLET | Refills: 0 | Status: SHIPPED
Start: 2017-02-17 | End: 2017-03-14

## 2017-02-17 RX ORDER — ACETAMINOPHEN 325 MG/1
650 TABLET ORAL EVERY 6 HOURS
Qty: 50 TABLET | Refills: 0 | Status: SHIPPED
Start: 2017-02-17

## 2017-02-17 RX ADMIN — OMEPRAZOLE 20 MG: 20 CAPSULE, DELAYED RELEASE ORAL at 08:03

## 2017-02-17 RX ADMIN — NICOTINE 1 PATCH: 14 PATCH, EXTENDED RELEASE TRANSDERMAL at 07:42

## 2017-02-17 RX ADMIN — ACETAMINOPHEN 650 MG: 325 TABLET, FILM COATED ORAL at 06:49

## 2017-02-17 RX ADMIN — IBUPROFEN 600 MG: 600 TABLET ORAL at 10:18

## 2017-02-17 RX ADMIN — IBUPROFEN 600 MG: 600 TABLET ORAL at 04:17

## 2017-02-17 RX ADMIN — ACETAMINOPHEN 650 MG: 325 TABLET, FILM COATED ORAL at 00:12

## 2017-02-17 RX ADMIN — ENOXAPARIN SODIUM 40 MG: 40 INJECTION SUBCUTANEOUS at 08:03

## 2017-02-17 ASSESSMENT — PAIN DESCRIPTION - DESCRIPTORS: DESCRIPTORS: DULL

## 2017-02-17 NOTE — PLAN OF CARE
Problem: Goal Outcome Summary  Goal: Goal Outcome Summary  Outcome: No Change  A&O, VSS on RA. Denies pain and nausea and rested well between cares. Abdominal incision and port stapled and c/d/i. + gas, + BM. Pt stated she had 3 bouts of diarrhea over night.  Tolerating regular diet. Up independently. Able to make needs known. Possible d/c home today. Continue to monitor and follow POC.

## 2017-02-17 NOTE — PROGRESS NOTES
"[facility]  \"Hello, my name is Dorothy Alvarado , and I am calling from the [facility].  I want to check in and see how you are doing, after leaving the hospital.  You may also receive a call from your Care Coordinator (care team), but I want to make sure you don t have any urgent needs.  I have a couple questions to review with you:     Post-Discharge Outreach                                                    Lucero Dsouza is a 61 year old female     Follow-up Appointments           Adult [facility] Follow-up and recommended labs and tests       Appointments on [place] and/or [facility] (with [facility] or [facility] provider or service). Call [phone] if you haven't heard regarding these appointments within 7 days of discharge.                       Your next 10 appointments already scheduled            Mar 06, 2017 7:30 AM CST   (Arrive by 7:15 AM)   Return Visit with Juan Daniel MD   [facility]              Care Team:    Patient Care Team       Relationship Specialty Notifications Start End    Uvaldo Griffin PCP - General Family Practice  1/23/17     Phone: [phone] Fax: [phone]         [facility] [address] [facility] [address]            Transition of Care Review                                                      Did you have a surgery or procedure during your hospital visit? Yes   If yes, do you have any of the following:     Signs of infection:  No    Pain:  Yes     Pain Scale (0-10) 2/10     Location: surg site    Wound/incision concerns? No    Do you have all of your medications/refills?  Yes    Are you having any side effects or questions about your medication(s)? No    Do you have any new or worsening symptoms?  No    Do you have any future appointments scheduled?   Yes   3/6/17             Plan                                                      Thanks for your time.  " Your Care Coordinator may follow-up within the next couple days.  In the meantime if you have questions, concerns or problems call your care team.        Dorothy Alvarado

## 2017-02-17 NOTE — PLAN OF CARE
Problem: Goal Outcome Summary  Goal: Goal Outcome Summary  Outcome: Adequate for Discharge Date Met:  02/17/17  VSS. Pain controlled w/ tyelonol and ibproufen. Denies nausea. Midline and IP incision stapled, CDI. Passing gas and liquid BMs. Voiding spont. Tolerating regular diet. Up ad suleman. Discharge teaching complete. Pt left unit in stable condition with Sister.

## 2017-02-17 NOTE — DISCHARGE INSTRUCTIONS
GENERAL POST-OPERATIVE    PATIENT INSTRUCTIONS       FOLLOW-UP:     Call Surgeon if you have:    Temperature greater than 100.4  Persistent nausea and vomiting  Severe uncontrolled pain  Redness, tenderness, or signs of infection (pain, swelling, redness, odor or green/yellow discharge around the site)  Difficulty breathing, headache or visual disturbances  Hives  Persistent dizziness or light-headedness  Extreme fatigue    Any other questions or concerns you may have after discharge    In an emergency, call 911 or go to an Emergency Department at a nearby hospital    WOUND CARE INSTRUCTIONS: Keep a dry clean dressing on the wound if there is drainage. The initial bandage may be removed after 24 hours. Once the wound has quit draining you may leave it open to air. If clothing rubs against the wound or causes irritation and the wound is not draining you may cover it with a dry dressing during the daytime. Try to keep the wound dry and avoid ointments on the wound unless directed to do so. If the wound becomes bright red and painful or starts to drain infected material that is not clear, please contact your physician immediately.     1. You may shower 48 hrs after surgery    2. No soaking in the tub     DIET: There are no dietary restrictions. You may eat any foods that you can tolerate. It is a good idea to eat a high fiber diet and take in plenty of fluids to prevent constipation. If you do become constipated you may want to take a mild laxative or take ducolax tablets on a daily basis until your bowel habits are regular. Constipation can be very uncomfortable, along with straining, after recent surgery.      ACTIVITY: You are encouraged to cough and deep breath or use your incentive spirometer if you were given one, every 15-30 minutes when awake. This will help prevent respiratory complications and low grade fevers post-operatively if you had a general anesthetic. You may want to hug a pillow when coughing and  sneezing to add additional support to the surgical area, if you had abdominal or chest surgery, which will decrease pain during these times.       1. No heavy lifting >20lbs or strenuous exercise for six-eight weeks. No exercise in which you are using core muscles (yoga, pilates, swimming, weight lifting)    2. You may walk as much as you wish. You are encouraged to increase your activity each day after surgery. Stairs are okay.     3. Nothing per vagina for eight weeks. No tampons, no intercourse, no douching. You can expect some light vaginal spotting and discharge for up to six weeks. If bleeding becomes heavy, please contact the office.       MEDICATIONS: Try to take narcotic medications and anti-inflammatory medications, such as tylenol, ibuprofen, naprosyn, etc., with food. This will minimize stomach upset from the medication. Should you develop nausea and vomiting from the pain medication, or develop a rash, please discontinue the medication and contact your physician. You should not drive, make important decisions, or operate machinery when taking narcotic pain medication.      OTHER: Patients are often constipated after general anesthesia and surgery. The patient should continue to take stool softeners (for example, Senokot-S) for the next six weeks and consume adequate amounts of water. If the patient remains constipated or unable to pass stool, please try one or all of the following measures:    1. Milk of Magnesia 30cc twice a day as needed by mouth    2. Metamucil 2 tablespoons in 12 ounces of fluid    3. Dulcolax oral or suppositories    4. Prunes or prune juice    5. Miralax daily      QUESTIONS: Please feel free to call your physician or the hospital  if you have any questions, and they will be glad to assist you.

## 2017-02-17 NOTE — PLAN OF CARE
Problem: Goal Outcome Summary  Goal: Goal Outcome Summary  Outcome: Improving  VSS. Patient UAL. Ambulating halls independently. Showered. Abdominal incision  & IP port incision stapled c/d/i. Tolerating diet. C/o gas pain. + flatus. No stool. Pain controled c Tylenol and  Ibuprofen. Continue c POC.

## 2017-02-17 NOTE — PROGRESS NOTES
Gynecology Oncology Progress Note            Date: 2/17/2017   POD# 3  Procedure: exploratory Laparotomy, Total Abdominal Hysterectomy, Bilateral Salpingo-Oophorectomy, Tumor Debulking with Cusa, Omentectomy, Intraperitoneal Port Placement, cystoscopy, proctoscopy, bilateral pelvic and paraaortic lymph node dissection  Disease: IIIC high grade serous ovarian cancer    24 hour events:   - Episodes of diarrhea overnight x3    Subjective: Tolerating regular diet. Ambulating. Passing flatus and having BMs.  Did have two episodes of diarrhea yesterday and one this morning. Voiding spontaneously. Pain controlled with meds. Feels ready to go home.    Objective:   Vitals:    02/16/17 0023 02/16/17 0740 02/16/17 1534 02/16/17 2300   BP: 108/52 100/58 104/54 118/55   BP Location:  Left arm Left arm Right arm   Pulse:  75     Resp: 16 16 16 16   Temp: 96.4  F (35.8  C) 97.1  F (36.2  C) 97.7  F (36.5  C) 98  F (36.7  C)   TempSrc: Oral Oral Oral Oral   SpO2: 92% 93% 99% 95%   Weight:       Height:         EXAM:   General: appears comfortable, in NAD  CV: RRR, no murmurs noted  Resp: CTAB ,decreased in bases.  Abdomen: soft, appropriately tender, mildly distended, + BS.  Incision: clean, dry, intact, no erythema or drainage, mild crepitus around right IP port  Extremities: nontender, no BLE edema, SCDs in place    24h fluid balance:   Date 02/16/17 0700 - 02/17/17 0659   Shift 9668-4772 8259-5471 0727-2147 24 Hour Total   I  N  T  A  K  E   P.O. 500   500    Shift Total  (mL/kg) 500  (8.46)   500  (8.46)   O  U  T  P  U  T   Urine  400  400    Shift Total  (mL/kg)  400  (6.77)  400  (6.77)   Weight (kg) 59.1 59.1 59.1 59.1     Lines: PIV, IP port    Labs: None    Assessment: Lucero Dsouza is a 61 year old female POD#3 s/p exploratory Laparotomy, Total Abdominal Hysterectomy, Bilateral Salpingo-Oophorectomy, Tumor Debulking with CUSA, Omentectomy, Intraperitoneal Port Placement, cystoscopy, proctoscopy, bilateral pelvic  and paraaortic lymph node dissection.     Active Problem list:  -Likely IIIC ovarian cancer with 1.8 L ascites evacuated  -Tobacco abuse  -Acute blood loss anemia    Plan:    Dz: Likely IIIC ovarian cancer, final pathology pending.  FEN: Regular diet  Pain: Transitioning to oral oxycodone and tylenol  Heme: Hgb 11.1> (9.8 intraop)> 9> 8.8>7.9. Likely from fluid shifts/equilibration. No signs of acute abdomen.  CV: Hyperlipidemia - no meds  Pulm: Encouraged IS.  GI: GERD - home H2 blocker ordered.  Previously on bowel regimen, now with diarrhea.  Will check C. Diff and hold stool softeners.  : Voiding spontaneously.  ID: No issues  Endocrine: No issues  Psych/Neuro: Current smoker of 20 cigarettes a day, nicotine patch ordered. Remote history of alcohol and methamphetamine abuse.  PPX: SCDs, H2B, Lovenox in AM. Will need discharge with this.  Dispo: Anticipate discharge home pending C. Diff testing, as patient is meeting postop goals    Kim Vale MD  Obstetrics and Gynecology PGY-4       Patient seen and examined by me with the team (note delayed).  I agree with the above findings and plan.    Juan Daniel

## 2017-02-20 ENCOUNTER — CARE COORDINATION (OUTPATIENT)
Dept: ONCOLOGY | Facility: CLINIC | Age: 62
End: 2017-02-20

## 2017-02-20 NOTE — PROGRESS NOTES
Care Coordinator Note  Called patient to clarify what her plans are for chemotherapy.  Reviewed that Dr. Daniel's chemotherapy plan is IV/IP chemo and that it is a specialized chemotherapy protocol that is not done at many medical centers.  Patient understands that I will call cancer centers in her area, but it is likely that she will need to return to the [facility] for the chemo.  We did talk about the possibility of staying at the [facility] to decrease her travel time.  She plans to return to clinic 3/6/17 and at that time we will further discuss the chemotherapy plan.       Pt verbalized back understanding of the above information discussed.     Cordelia ALBRECHT, RN  Care Coordinator  Gynecologic Cancer   Office:  [phone]  Pager: [phone] #[phone]

## 2017-03-04 NOTE — PROGRESS NOTES
Consult Notes on Referred Patient       Dr. Uvaldo Griffin  [facility]   [address] [place] [address]       RE: Lucero Dsouza  : 1955  RIGOBERTO: 3/6/2017      Dear Dr. Uvaldo Griffin:    I had the pleasure of seeing your patient Lucero Dsouza here at the Gynecologic Cancer Clinic at the [facility] on 2017.  As you know she is a very pleasant 61 year old woman with a recent diagnosis of  Pelvic mass and ascites associated with high CA-125 (>5000).  Given these findings she was subsequently sent to the Gynecologic Cancer Clinic for new patient consultation.     She underwent surgery on 17:    FINAL DIAGNOSIS:   A. SPECIMEN, NO CHARGE PASS THROUGH FOR BIONET:   - No diagnosis rendered     B. FALLOPIAN TUBE AND OVARY, RIGHT, SALPINGO-OOPHORECTOMY:   - Ovarian high grade serous carcinoma   - Ovarian surface involved by malignancy   - Lymphovascular invasion is identified   - Uterine serosa involved by high grade serous carcinoma   - Inactive endometrium   - Leiomyomas, up to 4.6 cm   - Cervix with squamous metaplasia   - Endometriosis involving ovary and fallopian tube   - Fallopian tube, negative for malignancy     C. UTERUS, CERVIX, LEFT FALLOPIAN TUBE AND OVARY, HYSTERECTOMY AND LEFT   SALPINGO-OOPHORECTOMY:   - Ovarian high grade serous carcinoma   - Lymphovascular invasion is identified   - Fallopian tube involved by high grade serous carcinoma   - Fallopian tube and ovary with endometriosis     D. SOFT TISSUE, OMENTUM, RESECTION:   - Metastatic high grade serous carcinoma   - Lymphovascular invasion is identified     E. SOFT TISSUE, PERITONEUM, LEFT PELVIC, EXCISION:   - Metastatic high grade serous carcinoma   - Lymphovascular invasion is identified     F. LYMPH NODES, RIGHT PELVIC, EXCISION:   - Metastatic carcinoma in two of five lymph nodes (2/5)     G. LYMPH NODES, LEFT PELVIC, EXCISION:   - Metastatic carcinoma in two of three lymph nodes  (2/3)     H. LYMPH NODE, VENA CAVA, EXCISION:   - Metastatic carcinoma in four of seven lymph nodes (4/7)      Answers for HPI/ROS submitted by the patient on 3/6/2017   General Symptoms: Yes  Skin Symptoms: No  HENT Symptoms: No  EYE SYMPTOMS: No  HEART SYMPTOMS: No  LUNG SYMPTOMS: No  INTESTINAL SYMPTOMS: No  URINARY SYMPTOMS: No  GYNECOLOGIC SYMPTOMS: No  BREAST SYMPTOMS: No  SKELETAL SYMPTOMS: No  BLOOD SYMPTOMS: No  NERVOUS SYSTEM SYMPTOMS: No  MENTAL HEALTH SYMPTOMS: No  Fever: No  Loss of appetite: No  Weight loss: No  Weight gain: No  Fatigue: Yes  Night sweats: Yes  Chills: No  Increased stress: No  Excessive hunger: No  Excessive thirst: No  Feeling hot or cold when others believe the temperature is normal: No  Loss of height: No  Post-operative complications: No  Surgical site pain: Yes  Hallucinations: No  Change in or Loss of Energy: No  Hyperactivity: No  Confusion: No    Past Medical History:    Past Medical History   Diagnosis Date     Abdominal mass, RLQ (right lower quadrant)      Asthma      COPD (chronic obstructive pulmonary disease) (H)      Depression      GERD (gastroesophageal reflux disease)      History of alcohol abuse      History of methamphetamine abuse      HLD (hyperlipidemia)      Tobacco use          Past Surgical History:    Past Surgical History   Procedure Laterality Date     Laminect/discectomy, lumbar       Carpal tunnel release rt/lt       Colonoscopy       Appendectomy       Hysterectomy total abd, hamida salpingo-oophorectomy, node dissection, tumor debulking, combined Bilateral 2/14/2017     Procedure: COMBINED HYSTERECTOMY TOTAL ABDOMINAL, SALPINGO-OOPHORECTOMY, NODE DISSECTION, TUMOR DEBULKING;  Surgeon: Juan Daniel MD;  [facility]     Cystoscopy N/A 2/14/2017     Procedure: CYSTOSCOPY;  Surgeon: Juan Daniel MD;  Location: [facility] OR     Insert port peritoneal access  2/14/2017     Procedure: INSERT PORT PERITONEAL ACCESS;  Surgeon: Fredy  "Juan Maya MD;  Location:  OR         Health Maintenance:  Health Maintenance Due   Topic Date Due     JEREL QUESTIONNAIRE 1 YEAR  1955     PHQ-9 Q1YR (NO INBASKET)  1955     URINE DRUG SCREEN Q1 YR  03/02/1972     TETANUS IMMUNIZATION (SYSTEM ASSIGNED)  03/02/1975     ADVANCE DIRECTIVE PLANNING Q5 YRS (NO INBASKET)  03/02/1975     HEPATITIS C SCREENING  03/02/1975     PAP SCREENING Q3 YR (SYSTEM ASSIGNED)  03/02/1978     MAMMO SCREEN Q2 YR (SYSTEM ASSIGNED)  03/02/1997     LIPID SCREEN Q5 YR FEMALE (SYSTEM ASSIGNED)  03/02/2002     COLON CANCER SCREEN (SYSTEM ASSIGNED)  03/02/2007           Last Colonoscopy: 2013              Result: abnormal: polyps (follow-up 2018)                    Current Medications:     has a current medication list which includes the following prescription(s): acetaminophen, enoxaparin, oxycodone, senna-docusate, fluticasone-salmeterol, omeprazole, albuterol, nicotine, omeprazole, multiple vitamins-minerals, bismuth subsalicylate, ascorbic acid, and glucosamine-chondroitin.       Allergies:        Allergies   Allergen Reactions     Sulfa Drugs Shortness Of Breath     Cefaclor Hives     Morphine GI Disturbance     migraines         Social History:     Social History   Substance Use Topics     Smoking status: Current Every Day Smoker     Packs/day: 2.00     Smokeless tobacco: Not on file     Alcohol use No       History   Drug Use No           Family History:     The patient's family history is notable for .    Family History   Problem Relation Age of Onset     Colon Cancer Mother      Substance Abuse Father      Coronary Artery Disease Father      Hypertension Father      Hyperlipidemia Father      Depression Father      Colon Cancer Maternal Grandfather          Physical Exam:     PS: 0  VS: /73  Pulse 88  Temp 97.8  F (36.6  C) (Oral)  Ht 1.68 m (5' 6.14\")  Wt 54.9 kg (121 lb 1.6 oz)  SpO2 98%  BMI 19.46 kg/m2    General Appearance: healthy and alert, no distress   "   HEENT:  no thyromegaly, no palpable nodules or masses        Cardiovascular: regular rate and rhythm, no gallops, rubs or murmurs     Respiratory: lungs clear, no rales, rhonchi or wheezes, normal diaphragmatic excursion    Musculoskeletal: extremities non tender and without edema    Skin: no lesions or rashes     Neurological: normal gait, no gross defects     Psychiatric: appropriate mood and affect                               Hematological: normal cervical, supraclavicular and inguinal lymph nodes     Gastrointestinal:       abdomen soft, non-tender, non-distended, no organomegaly or masses    Genitourinary: deferred      Assessment:    Lucero Dsouza is a woman with a new diagnosis of metastatic ovarian cancer.     A total of 40 minutes was spent with the patient, 30 minutes of which were spent in counseling the patient and/or treatment planning.      Plan:     1.)   Ovarian Cancer: I have discussed the pathologic findings with Ms. Dsouza.  I have recommended IV/IP therapy for 6 cycles.  WE have discussed the alternative options including dose dense chemotherapy.  She is inclined to have treatment closer to home which is reasonable.  She has identified Dr. Treasure Deutsch as an oncologist in [place].  Unfortunately Cordelia my chemo nurse was not available for this meeting - she will call pateint today to help facilitate the establishment of care.     2.) Genetic risk factors were assessed and the patient meets qualifications for a referral. Will consider the MAGIC trial when she returns for mid treatment testing.    3.) Labs and/or tests ordered include:   Pre-chemo labs.     4.) Health maintenance issues addressed today include none.      Thank you for allowing us to participate in the care of your patient.         Sincerely,      Juan Daniel      CC  Patient Care Team:  Delphine Griffin as PCP - General (Family Practice)  DELPHINE GRIFFIN

## 2017-03-06 ENCOUNTER — ONCOLOGY VISIT (OUTPATIENT)
Dept: ONCOLOGY | Facility: CLINIC | Age: 62
End: 2017-03-06
Attending: OBSTETRICS & GYNECOLOGY
Payer: COMMERCIAL

## 2017-03-06 ENCOUNTER — CARE COORDINATION (OUTPATIENT)
Dept: ONCOLOGY | Facility: CLINIC | Age: 62
End: 2017-03-06

## 2017-03-06 VITALS
BODY MASS INDEX: 19.46 KG/M2 | TEMPERATURE: 97.8 F | OXYGEN SATURATION: 98 % | SYSTOLIC BLOOD PRESSURE: 136 MMHG | DIASTOLIC BLOOD PRESSURE: 73 MMHG | WEIGHT: 121.1 LBS | HEIGHT: 66 IN | HEART RATE: 88 BPM

## 2017-03-06 DIAGNOSIS — C56.9 MALIGNANT NEOPLASM OF OVARY, UNSPECIFIED LATERALITY (H): Primary | ICD-10-CM

## 2017-03-06 PROCEDURE — 99212 OFFICE O/P EST SF 10 MIN: CPT | Mod: ZF

## 2017-03-06 PROCEDURE — 99215 OFFICE O/P EST HI 40 MIN: CPT | Mod: 24 | Performed by: OBSTETRICS & GYNECOLOGY

## 2017-03-06 RX ORDER — NICOTINE 21 MG/24HR
1 PATCH, TRANSDERMAL 24 HOURS TRANSDERMAL
COMMUNITY
Start: 2017-02-23

## 2017-03-06 ASSESSMENT — PAIN SCALES - GENERAL: PAINLEVEL: NO PAIN (1)

## 2017-03-06 ASSESSMENT — ENCOUNTER SYMPTOMS
WEIGHT GAIN: 0
ALTERED TEMPERATURE REGULATION: 0
WEIGHT LOSS: 0
INCREASED ENERGY: 0
FEVER: 0
POLYPHAGIA: 0
CHILLS: 0
DECREASED APPETITE: 0
NIGHT SWEATS: 1
HALLUCINATIONS: 0
POLYDIPSIA: 0
FATIGUE: 1

## 2017-03-06 NOTE — MR AVS SNAPSHOT
"              After Visit Summary   3/6/2017    Lucero Dsouza    MRN: [id number]           Patient Information     Date Of Birth          1955        Visit Information        Provider Department      3/6/2017 7:30 AM Juan Daniel MD [facility]        Today's Diagnoses     Malignant neoplasm of ovary, unspecified laterality (H)    -  1       Follow-ups after your visit        Who to contact     If you have questions or need follow up information about today's clinic visit or your schedule please contact [facility] directly at [phone].  Normal or non-critical lab and imaging results will be communicated to you by Roc2Lochart, letter or phone within 4 business days after the clinic has received the results. If you do not hear from us within 7 days, please contact the clinic through Dovetailt or phone. If you have a critical or abnormal lab result, we will notify you by phone as soon as possible.  Submit refill requests through [employer]iMed[employer] or call your pharmacy and they will forward the refill request to us. Please allow 3 business days for your refill to be completed.          Additional Information About Your Visit        MyChart Information     [employer] gives you secure access to your electronic health record. If you see a primary care provider, you can also send messages to your care team and make appointments. If you have questions, please call your primary care clinic.  If you do not have a primary care provider, please call [phone] and they will assist you.        Care EveryWhere ID     This is your Care EveryWhere ID. This could be used by other organizations to access your [facility] medical records  [id number]        Your Vitals Were     Pulse Temperature Height Pulse Oximetry BMI (Body Mass Index)       88 97.8  F (36.6  C) (Oral) 1.68 m (5' 6.14\") 98% 19.46 kg/m2        Blood Pressure from Last 3 Encounters:   03/06/17 136/73   02/17/17 122/58 "   01/30/17 138/82    Weight from Last 3 Encounters:   03/06/17 54.9 kg (121 lb 1.6 oz)   02/14/17 59.1 kg (130 lb 4.7 oz)   01/30/17 60.1 kg (132 lb 9.6 oz)              Today, you had the following     No orders found for display       Primary Care Provider Office Phone # Fax #    Uvaldo Griffin [phone]       [facility] [address] [place] [id number]        Thank you!     Thank you for choosing [facility] CANCER [facility]  for your care. Our goal is always to provide you with excellent care. Hearing back from our patients is one way we can continue to improve our services. Please take a few minutes to complete the written survey that you may receive in the mail after your visit with us. Thank you!             Your Updated Medication List - Protect others around you: Learn how to safely use, store and throw away your medicines at www.disposemymeds.org.          This list is accurate as of: 3/6/17  7:44 AM.  Always use your most recent med list.                   Brand Name Dispense Instructions for use    acetaminophen 325 MG tablet    TYLENOL    50 tablet    Take 2 tablets (650 mg) by mouth every 6 hours       ADVAIR DISKUS 100-50 MCG/DOSE diskus inhaler   Generic drug:  fluticasone-salmeterol      Inhale 1 puff into the lungs       albuterol 108 (90 BASE) MCG/ACT Inhaler    PROAIR HFA/PROVENTIL HFA/VENTOLIN HFA     Inhale 2 puffs into the lungs daily as needed       enoxaparin 40 MG/0.4ML injection    LOVENOX    25 Syringe    Inject 0.4 mLs (40 mg) Subcutaneous every 24 hours       glucosamine-chondroitin 500-400 MG Caps per capsule      Take 1 capsule by mouth daily Reported on 3/6/2017       MULTIVITAMIN ADULT PO      Take 1 tablet by mouth daily Reported on 3/6/2017       * NICOTROL 10 MG Inhaler   Generic drug:  nicotine      Inhale 1-2 puffs into the lungs daily as needed       * nicotine 14 MG/24HR 24 hr patch    NICODERM CQ     1 patch Reported on 3/6/2017       * nicotine  7 MG/24HR 24 hr patch    NICODERM CQ     1 patch Reported on 3/6/2017       oxyCODONE 5 MG IR tablet    ROXICODONE    20 tablet    Take 1-2 tablets (5-10 mg) by mouth every 3 hours as needed for moderate to severe pain       PEPTO-BISMOL PO      Take by mouth daily       * priLOSEC 20 MG CR capsule   Generic drug:  omeprazole      Take 20 mg by mouth daily       * omeprazole 20 MG CR capsule    priLOSEC     Take 20 mg by mouth       senna-docusate 8.6-50 MG per tablet    SENOKOT-S;PERICOLACE    50 tablet    Take 1-2 tablets by mouth 2 times daily for 50 doses       VITAMIN C PO      Take 500 mg by mouth daily Reported on 3/6/2017       * Notice:  This list has 5 medication(s) that are the same as other medications prescribed for you. Read the directions carefully, and ask your doctor or other care provider to review them with you.

## 2017-03-06 NOTE — LETTER
3/6/2017       RE: Lucero Dsouza  [address] [facility] [address]     Dear Colleague,    Thank you for referring your patient, Lucero Dsouza, to the [facility] CANCER [facility]IC. Please see a copy of my visit note below.                                Consult Notes on Referred Patient       Dr. Uvaldo Griffin  [facility]   [address] [facility] [place] [address]       RE: Lucero Dsouza  : 1955  RIGOBERTO: 3/6/2017      Dear Dr. Uvaldo Griffin:    I had the pleasure of seeing your patient Lucero Dsouza here at the Gynecologic Cancer Clinic at the [facility] on 2017.  As you know she is a very pleasant 61 year old woman with a recent diagnosis of  Pelvic mass and ascites associated with high CA-125 (>5000).  Given these findings she was subsequently sent to the Gynecologic Cancer Clinic for new patient consultation.     She underwent surgery on 17:    FINAL DIAGNOSIS:   A. SPECIMEN, NO CHARGE PASS THROUGH FOR BIONET:   - No diagnosis rendered     B. FALLOPIAN TUBE AND OVARY, RIGHT, SALPINGO-OOPHORECTOMY:   - Ovarian high grade serous carcinoma   - Ovarian surface involved by malignancy   - Lymphovascular invasion is identified   - Uterine serosa involved by high grade serous carcinoma   - Inactive endometrium   - Leiomyomas, up to 4.6 cm   - Cervix with squamous metaplasia   - Endometriosis involving ovary and fallopian tube   - Fallopian tube, negative for malignancy     C. UTERUS, CERVIX, LEFT FALLOPIAN TUBE AND OVARY, HYSTERECTOMY AND LEFT   SALPINGO-OOPHORECTOMY:   - Ovarian high grade serous carcinoma   - Lymphovascular invasion is identified   - Fallopian tube involved by high grade serous carcinoma   - Fallopian tube and ovary with endometriosis     D. SOFT TISSUE, OMENTUM, RESECTION:   - Metastatic high grade serous carcinoma   - Lymphovascular invasion is identified     E. SOFT TISSUE, PERITONEUM, LEFT PELVIC, EXCISION:   - Metastatic high grade serous carcinoma   -  Lymphovascular invasion is identified     F. LYMPH NODES, RIGHT PELVIC, EXCISION:   - Metastatic carcinoma in two of five lymph nodes (2/5)     G. LYMPH NODES, LEFT PELVIC, EXCISION:   - Metastatic carcinoma in two of three lymph nodes (2/3)     H. LYMPH NODE, VENA CAVA, EXCISION:   - Metastatic carcinoma in four of seven lymph nodes (4/7)      Answers for HPI/ROS submitted by the patient on 3/6/2017   General Symptoms: Yes  Skin Symptoms: No  HENT Symptoms: No  EYE SYMPTOMS: No  HEART SYMPTOMS: No  LUNG SYMPTOMS: No  INTESTINAL SYMPTOMS: No  URINARY SYMPTOMS: No  GYNECOLOGIC SYMPTOMS: No  BREAST SYMPTOMS: No  SKELETAL SYMPTOMS: No  BLOOD SYMPTOMS: No  NERVOUS SYSTEM SYMPTOMS: No  MENTAL HEALTH SYMPTOMS: No  Fever: No  Loss of appetite: No  Weight loss: No  Weight gain: No  Fatigue: Yes  Night sweats: Yes  Chills: No  Increased stress: No  Excessive hunger: No  Excessive thirst: No  Feeling hot or cold when others believe the temperature is normal: No  Loss of height: No  Post-operative complications: No  Surgical site pain: Yes  Hallucinations: No  Change in or Loss of Energy: No  Hyperactivity: No  Confusion: No    Past Medical History:    Past Medical History   Diagnosis Date     Abdominal mass, RLQ (right lower quadrant)      Asthma      COPD (chronic obstructive pulmonary disease) (H)      Depression      GERD (gastroesophageal reflux disease)      History of alcohol abuse      History of methamphetamine abuse      HLD (hyperlipidemia)      Tobacco use          Past Surgical History:    Past Surgical History   Procedure Laterality Date     Laminect/discectomy, lumbar       Carpal tunnel release rt/lt       Colonoscopy       Appendectomy       Hysterectomy total abd, hamida salpingo-oophorectomy, node dissection, tumor debulking, combined Bilateral 2/14/2017     Procedure: COMBINED HYSTERECTOMY TOTAL ABDOMINAL, SALPINGO-OOPHORECTOMY, NODE DISSECTION, TUMOR DEBULKING;  Surgeon: Juan Daniel MD;   [facility]     Cystoscopy N/A 2/14/2017     Procedure: CYSTOSCOPY;  Surgeon: Juan Daniel MD;  [facility]     Insert port peritoneal access  2/14/2017     Procedure: INSERT PORT PERITONEAL ACCESS;  Surgeon: Juan Daniel MD;  [facility] OR         Health Maintenance:  Health Maintenance Due   Topic Date Due     JEREL QUESTIONNAIRE 1 YEAR  1955     PHQ-9 Q1YR (NO INBASKET)  1955     URINE DRUG SCREEN Q1 YR  03/02/1972     TETANUS IMMUNIZATION (SYSTEM ASSIGNED)  03/02/1975     ADVANCE DIRECTIVE PLANNING Q5 YRS (NO INBASKET)  03/02/1975     HEPATITIS C SCREENING  03/02/1975     PAP SCREENING Q3 YR (SYSTEM ASSIGNED)  03/02/1978     MAMMO SCREEN Q2 YR (SYSTEM ASSIGNED)  03/02/1997     LIPID SCREEN Q5 YR FEMALE (SYSTEM ASSIGNED)  03/02/2002     COLON CANCER SCREEN (SYSTEM ASSIGNED)  03/02/2007           Last Colonoscopy: 2013              Result: abnormal: polyps (follow-up 2018)                    Current Medications:     has a current medication list which includes the following prescription(s): acetaminophen, enoxaparin, oxycodone, senna-docusate, fluticasone-salmeterol, omeprazole, albuterol, nicotine, omeprazole, multiple vitamins-minerals, bismuth subsalicylate, ascorbic acid, and glucosamine-chondroitin.       Allergies:        Allergies   Allergen Reactions     Sulfa Drugs Shortness Of Breath     Cefaclor Hives     Morphine GI Disturbance     migraines         Social History:     Social History   Substance Use Topics     Smoking status: Current Every Day Smoker     Packs/day: 2.00     Smokeless tobacco: Not on file     Alcohol use No       History   Drug Use No           Family History:     The patient's family history is notable for .    Family History   Problem Relation Age of Onset     Colon Cancer Mother      Substance Abuse Father      Coronary Artery Disease Father      Hypertension Father      Hyperlipidemia Father      Depression Father      Colon Cancer  "Maternal Grandfather          Physical Exam:     PS: 0  VS: /73  Pulse 88  Temp 97.8  F (36.6  C) (Oral)  Ht 1.68 m (5' 6.14\")  Wt 54.9 kg (121 lb 1.6 oz)  SpO2 98%  BMI 19.46 kg/m2    General Appearance: healthy and alert, no distress     HEENT:  no thyromegaly, no palpable nodules or masses        Cardiovascular: regular rate and rhythm, no gallops, rubs or murmurs     Respiratory: lungs clear, no rales, rhonchi or wheezes, normal diaphragmatic excursion    Musculoskeletal: extremities non tender and without edema    Skin: no lesions or rashes     Neurological: normal gait, no gross defects     Psychiatric: appropriate mood and affect                               Hematological: normal cervical, supraclavicular and inguinal lymph nodes     Gastrointestinal:       abdomen soft, non-tender, non-distended, no organomegaly or masses    Genitourinary: deferred      Assessment:    Lucero Dsouza is a woman with a new diagnosis of metastatic ovarian cancer.     A total of 40 minutes was spent with the patient, 30 minutes of which were spent in counseling the patient and/or treatment planning.      Plan:     1.)   Ovarian Cancer: I have discussed the pathologic findings with Ms. Dsouza.  I have recommended IV/IP therapy for 6 cycles.  WE have discussed the alternative options including dose dense chemotherapy.  She is inclined to have treatment closer to home which is reasonable.  She has identified Dr. Treasure Deutsch as an oncologist in [place].  Unfortunately Cordelia my chemo nurse was not available for this meeting - she will call pateint today to help facilitate the establishment of care.     2.) Genetic risk factors were assessed and the patient meets qualifications for a referral. Will consider the MAGIC trial when she returns for mid treatment testing.    3.) Labs and/or tests ordered include:   Pre-chemo labs.     4.) Health maintenance issues addressed today include none.      Thank you for allowing us to " participate in the care of your patient.         Sincerely,      Juan Daniel      CC  Patient Care Team:  Uvaldo Griffin as PCP - General (Family Practice)

## 2017-03-06 NOTE — NURSING NOTE
"Lucero ANTUNEZ Meet is a 62 year old female who presents for:  Chief Complaint   Patient presents with     Oncology Clinic Visit     Return patient visit- Ovarian cancer (H)        Initial Vitals:  /73  Pulse 88  Temp 97.8  F (36.6  C) (Oral)  Ht 1.68 m (5' 6.14\")  Wt 54.9 kg (121 lb 1.6 oz)  SpO2 98%  BMI 19.46 kg/m2 Estimated body mass index is 19.46 kg/(m^2) as calculated from the following:    Height as of this encounter: 1.68 m (5' 6.14\").    Weight as of this encounter: 54.9 kg (121 lb 1.6 oz).. Body surface area is 1.6 meters squared. BP completed using cuff size: regular  No Pain (1) No LMP recorded. Allergies and medications reviewed.     Medications: Medication refills not needed today.  Pharmacy name entered into EPIC: Data Unavailable    Comments: pt mentioned that she have a discomfort feeling in the pelvic area and rated the pain as 1/10    7 minutes for nursing intake (face to face time)   Nahid Enriquez CMA        "

## 2017-06-06 ENCOUNTER — DOCUMENTATION ONLY (OUTPATIENT)
Dept: OTHER | Facility: CLINIC | Age: 62
End: 2017-06-06

## 2017-06-06 DIAGNOSIS — Z71.89 ACP (ADVANCE CARE PLANNING): Chronic | ICD-10-CM

## 2017-07-01 ENCOUNTER — HEALTH MAINTENANCE LETTER (OUTPATIENT)
Age: 62
End: 2017-07-01

## 2019-10-04 ENCOUNTER — HEALTH MAINTENANCE LETTER (OUTPATIENT)
Age: 64
End: 2019-10-04

## 2020-02-08 ENCOUNTER — HEALTH MAINTENANCE LETTER (OUTPATIENT)
Age: 65
End: 2020-02-08

## 2020-11-07 ENCOUNTER — HEALTH MAINTENANCE LETTER (OUTPATIENT)
Age: 65
End: 2020-11-07

## 2021-09-11 ENCOUNTER — HEALTH MAINTENANCE LETTER (OUTPATIENT)
Age: 66
End: 2021-09-11

## 2021-11-06 ENCOUNTER — HEALTH MAINTENANCE LETTER (OUTPATIENT)
Age: 66
End: 2021-11-06

## 2022-01-01 ENCOUNTER — HEALTH MAINTENANCE LETTER (OUTPATIENT)
Age: 67
End: 2022-01-01

## 2022-04-23 ENCOUNTER — HEALTH MAINTENANCE LETTER (OUTPATIENT)
Age: 67
End: 2022-04-23

## 2022-10-29 ENCOUNTER — HEALTH MAINTENANCE LETTER (OUTPATIENT)
Age: 67
End: 2022-10-29

## 2023-06-01 ENCOUNTER — HEALTH MAINTENANCE LETTER (OUTPATIENT)
Age: 68
End: 2023-06-01

## 2023-11-11 ENCOUNTER — HEALTH MAINTENANCE LETTER (OUTPATIENT)
Age: 68
End: 2023-11-11

## 2025-04-29 NOTE — PROGRESS NOTES
Care Coordinator Note  Patient informed that I would contact [facility] regarding scheduling an appointment with Dr. Deutsch, per patient' request, for planned IV/IP chemotherapy.  Patient understands she also needs to have a chest port placed prior to chemotherapy.  She understands [facility] will contact her with appointment information.      Pt verbalized back understanding of the above information discussed.     Faxed Attn:  [facility]/[place] #[phone]- Clinic Notes 3/6/17 and 1/30/17, Discharge Summary 2/17/17, Op Note 2/14/17,  Surgical Pathology 2/14/17, Cytology non gyn 2/14/17, Ca-125 Lab Result 1/30/17, IV/IP Chemotherapy Orders Outline.    Cordelia Zamora MSN, RN  Care Coordinator  Gynecologic Cancer   Office:  [phone]  Pager: [phone] #[phone]       Returned call to Julio and reviewed Dr. Saldana's message.  She verbalized understanding of recommendations.    Thank you,  Qi DILLARD RN  Triage Nurse [facility]INA  04/29/25 15:19 EDT

## (undated) DEVICE — SYR 10ML LL W/O NDL

## (undated) DEVICE — CUSA 23KHZ TIP STD C4601S

## (undated) DEVICE — SYR BULB IRRIG 50ML LATEX FREE 0035280

## (undated) DEVICE — SU MONOCRYL 4-0 PS-2 27" UND Y426H

## (undated) DEVICE — PAD PERI INDIV WRAP 11" 2022A

## (undated) DEVICE — PREP CHLORAPREP 26ML TINTED ORANGE  260815

## (undated) DEVICE — LINEN TOWEL PACK X6 WHITE 5487

## (undated) DEVICE — SU VICRYL 2-0 TIE 54" J615H

## (undated) DEVICE — CATH TRAY FOLEY 16FR W/URINE METER STATLOCK 303316A

## (undated) DEVICE — SOL WATER IRRIG 1000ML BOTTLE 2F7114

## (undated) DEVICE — NDL BLUNT 18GA 1" W/O FILTER 305181

## (undated) DEVICE — SYR 10ML SLIP TIP W/O NDL

## (undated) DEVICE — SUCTION MANIFOLD DORNOCH ULTRA CART UL-CL500

## (undated) DEVICE — CUSA 23KHZ WRENCH C5601

## (undated) DEVICE — PREP SCRUB CARE CHLOROXYLENOL (PCMX) 4OZ 29902-004

## (undated) DEVICE — PANTIES MESH LG/XLG 2PK 706M2

## (undated) DEVICE — TUBING IRRIG CYSTO/BLADDER SET 81" LF 2C4040

## (undated) DEVICE — TUBING IV 69" STERILE 1C8160S

## (undated) DEVICE — SU PDS II 0 TP-1 60" Z991G

## (undated) DEVICE — DRAPE LEGGINGS CLEAR 8430

## (undated) DEVICE — DRSG PRIMAPORE 02X3" 7133

## (undated) DEVICE — WIPES FOLEY CARE SURESTEP PROVON DFC100

## (undated) DEVICE — SPONGE LAP 18X18" X8435

## (undated) DEVICE — SOL NACL 0.9% IRRIG 1000ML BOTTLE 2F7124

## (undated) DEVICE — SOL NACL 0.9% 10ML VIAL 0409-4888-02

## (undated) DEVICE — ESU GROUND PAD ADULT W/CORD E7507

## (undated) DEVICE — SOL NACL 0.9% INJ 1000ML BAG 07983-09

## (undated) DEVICE — GOWN LG DISP 9515

## (undated) DEVICE — TUBING CUSA MANIFOLD 23KHZ C3600

## (undated) DEVICE — JELLY LUBRICATING SURGILUBE 2OZ TUBE

## (undated) DEVICE — LINEN TOWEL PACK X5 5464

## (undated) DEVICE — SU VICRYL 2-0 CT-1 27" UND J259H

## (undated) DEVICE — LINEN TOWEL PACK X30 5481

## (undated) DEVICE — GOWN XLG DISP 9545

## (undated) DEVICE — CLIP HORIZON LG ORANGE 004200

## (undated) DEVICE — NDL 25GA 2"  8881200441

## (undated) DEVICE — BLADE CLIPPER SGL USE 9680

## (undated) DEVICE — GOWN REINFORCED XXLG 9071

## (undated) DEVICE — PACK AB HYST II

## (undated) DEVICE — SU PROLENE 5-0 RB-1DA 36"  8556H

## (undated) DEVICE — Device

## (undated) DEVICE — SU VICRYL 2-0 CT-2 27" J333H

## (undated) DEVICE — WIPE PREMOIST CLEANSING WASHCLOTHS 7988

## (undated) RX ORDER — CIPROFLOXACIN 2 MG/ML
INJECTION, SOLUTION INTRAVENOUS
Status: DISPENSED
Start: 2017-02-14

## (undated) RX ORDER — HYDROMORPHONE HYDROCHLORIDE 1 MG/ML
INJECTION, SOLUTION INTRAMUSCULAR; INTRAVENOUS; SUBCUTANEOUS
Status: DISPENSED
Start: 2017-02-14

## (undated) RX ORDER — HEPARIN SODIUM 5000 [USP'U]/.5ML
INJECTION, SOLUTION INTRAVENOUS; SUBCUTANEOUS
Status: DISPENSED
Start: 2017-02-14

## (undated) RX ORDER — FENTANYL CITRATE 50 UG/ML
INJECTION, SOLUTION INTRAMUSCULAR; INTRAVENOUS
Status: DISPENSED
Start: 2017-02-14

## (undated) RX ORDER — IPRATROPIUM BROMIDE AND ALBUTEROL SULFATE 2.5; .5 MG/3ML; MG/3ML
SOLUTION RESPIRATORY (INHALATION)
Status: DISPENSED
Start: 2017-02-14

## (undated) RX ORDER — PHENAZOPYRIDINE HYDROCHLORIDE 200 MG/1
TABLET, FILM COATED ORAL
Status: DISPENSED
Start: 2017-02-14